# Patient Record
Sex: FEMALE | Employment: FULL TIME | URBAN - METROPOLITAN AREA
[De-identification: names, ages, dates, MRNs, and addresses within clinical notes are randomized per-mention and may not be internally consistent; named-entity substitution may affect disease eponyms.]

---

## 2022-10-07 ENCOUNTER — HOSPITAL ENCOUNTER (OUTPATIENT)
Dept: PSYCHIATRY | Age: 42
Setting detail: THERAPIES SERIES
Discharge: HOME | End: 2022-10-07
Payer: COMMERCIAL

## 2022-10-07 PROCEDURE — 90791 PSYCH DIAGNOSTIC EVALUATION: CPT

## 2022-10-07 RX ORDER — ONDANSETRON 4 MG/1
4 TABLET, FILM COATED ORAL EVERY 8 HOURS PRN
COMMUNITY

## 2022-10-07 RX ORDER — CLONAZEPAM 1 MG/1
1 TABLET ORAL 2 TIMES DAILY PRN
COMMUNITY
End: 2022-10-20 | Stop reason: SDUPTHER

## 2022-10-07 RX ORDER — FAMOTIDINE 20 MG/1
20 TABLET, FILM COATED ORAL 2 TIMES DAILY
COMMUNITY

## 2022-10-07 RX ORDER — BUPROPION HYDROCHLORIDE 150 MG/1
150 TABLET ORAL EVERY MORNING
COMMUNITY
End: 2022-10-20 | Stop reason: SDUPTHER

## 2022-10-07 ASSESSMENT — ANXIETY QUESTIONNAIRES
GAD7 TOTAL SCORE: 16
6. BECOMING EASILY ANNOYED OR IRRITABLE: 3
2. NOT BEING ABLE TO STOP OR CONTROL WORRYING: 3
5. BEING SO RESTLESS THAT IT IS HARD TO SIT STILL: 0
1. FEELING NERVOUS, ANXIOUS, OR ON EDGE: 3
IF YOU CHECKED OFF ANY PROBLEMS ON THIS QUESTIONNAIRE, HOW DIFFICULT HAVE THESE PROBLEMS MADE IT FOR YOU TO DO YOUR WORK, TAKE CARE OF THINGS AT HOME, OR GET ALONG WITH OTHER PEOPLE: EXTREMELY DIFFICULT
7. FEELING AFRAID AS IF SOMETHING AWFUL MIGHT HAPPEN: 1
4. TROUBLE RELAXING: 3
3. WORRYING TOO MUCH ABOUT DIFFERENT THINGS: 3

## 2022-10-07 ASSESSMENT — SLEEP AND FATIGUE QUESTIONNAIRES
DO YOU HAVE DIFFICULTY SLEEPING: YES
AVERAGE NUMBER OF SLEEP HOURS: 3
DO YOU USE A SLEEP AID: YES

## 2022-10-07 ASSESSMENT — PATIENT HEALTH QUESTIONNAIRE - PHQ9: SUM OF ALL RESPONSES TO PHQ QUESTIONS 1-9: 23

## 2022-10-07 NOTE — CARE COORDINATION
Biopsychosocial Assessment Note    Name: Jeffrey Mane  Date: 10/7/2022  Start Time: 5936  End Time:1445    Social work met with patient to complete the biopsychosocial assessment and CSSR-S. Mental Status Exam:  MSE reveals a 41 yo   female who looks her stated age. She was cooperative and pleasant and answered all questions asked of her. She was alert and oriented to person, time, place, and situation. Thoughts appeared goal directed and fairly organized. Eye contact was good. Mood was anxious and depressed and affect was congruent and appropriate. Judgement and insight appear good at this time as she is able to recognize need for iop level of care. Presenting Problem:  Pt is concerned about her major depression and anxiety. She reports that she is worst in winter and that she never came out of her depression this winter. She has been through multiple medication changes through Dr Adrian Mendes who she treats with at [facility] [address] [place] [address] in [place]. Patient Report and Notes:    Pt reports that she had been in counseling a lot as a child and adolescent but hasn't found it to be helpful. She just started seeing a counselor, Anika Smith but doesn't feel comfortable or connected with her. Pt came to assessment with her present , Karoline Allen. Davidbenigno Urbansandro Pt lives with her husb, Karoline Allen of 7 yrs. and their 5 yo son. Nabilab has a 12 yo son who also lives in home with them. This is pts second marriage. Pt was  previously for 8 yrs to her first husb. They have a son and a dtr together(age 15 and 15). Pt and ex husb had shared parenting and were getting along very well until the last visitation with him when he refused to allow the 15 and 12 yo to return home. Pt reports her 12 yo dtr started disrespecting pt and not wanting to follow the rules. . There is no communication between her and her ex husb or her 2 older children at this time.  Because of the shared custody, pt states that although she filed a police report, the court hearing is not scheduled until January, 2023. Pt states because her depression has been so bad that she had to take a leave of absence from work. She was working at a factory making cylinders. Pt reports that she and her present  are having financial difficulties and are presently going through bankruptcy. They just had to get rid of their truck and boat recently as a result. Pt reports that she is close with her father who is supportive. She also reports that  is very supportive and that they have a close group of friends who also are supportive. Pt reports that her mom is bipolar. Pt states Mom  stepfather when pt age 2. He then adopted pt. Pt reports having a close and supportive relationship with him. Mom and this man remain . She however has a poor relationship with her mom as Mom has a lot of mh problems and they \"butt heads a lot\". Pt has no contact with Jazlyn Wing fa.now. She states that her Alanna Missy filed a protection order in past on pt when she went to his house demanding that he agree to a paternity test which he refused to do. As she refused to leave, police were called. She hasn't had contact with him for many yrs now. Gender  [] Male [x] Female [] Transgender  [] Other    Sexual Orientation    [x] Heterosexual [] Homosexual [] Bisexual [] Other    Suicidal Ideation  [x] Reports   [] Denies    Homicidal Ideation  [] Reports   [x] Denies      Hallucinations/Delusions   [] Reports   [x] Denies     Substance Use/Alcohol Use/Addiction  [] Reports    [x] Denies     Trauma History trauma scpre=[id number]  [x] Reports    [] Denies     Plan of Care:  SW reviewed with pt IOP program. Although Pt lives in [place] which is quite far from here, she is agreeable to attending [facility] iop at [facility].  She plans to begin iop on 10/10./22    Patient Goal: \"To not feel like shit anymore\"    Patient PHQ 9 Score: 23  Interpretation of Total Score [id number] Severity: 1-4 = Minimal depression, 5-9 = Mild depression, 10-14 = Moderate depression, 15-19 = Moderately severe depression, 20-27 = Severe depression    GAD7=16    Preliminary Diagnosis and Criteria:   Pt reports poor appetite with weight loss, decreased energy level, increased tiredness, frequent crying, feelings of helplessness, hopelessness, impaired concentration, increased irritability, loss of interest in previously enjoyable activities, difficulty sleeping, feelings of doom, negative feelings about self, and thoughts that she would be better off dead. She also reports feeling anxious, not able to stop or control worrying, not able to relax, being easily annoyed and irritable. Preliminary diagnosis will be:   Major Depression, Severe episode  Rule out Bipolar Disorder      Signed: AMRKY Garcia               10/7/2022

## 2022-10-10 ENCOUNTER — HOSPITAL ENCOUNTER (OUTPATIENT)
Dept: PSYCHIATRY | Age: 42
Setting detail: THERAPIES SERIES
Discharge: HOME | End: 2022-10-10
Payer: COMMERCIAL

## 2022-10-10 PROCEDURE — 90853 GROUP PSYCHOTHERAPY: CPT

## 2022-10-10 NOTE — PROGRESS NOTES
Date: 10/10/22     Start Time: 1045     End Time:  5105     Number of Participants: 9     Type of Group: Cognitive skills  Topic of Group: 10 Ways to forgive Yourself and Let Go of the Past.   Group also reviewed Cognitive defusion skills and values/committed action goals. Patient's Goal: Pt will participate in discussion and reflect on prinicipals discussed re: forgiving self. Pt will be able to identify importance of forgiving self and letting go of the past.        Notes: Pt was an active participant in group. She participated when called upon in discussion and was able to do The Compassionate Response Relaxation exercise. She is starting to share own experiences, and was open to learning new information. Status After Intervention:  Unchanged     Participation Level:  Active Listener and Interactive     Participation Quality: Appropriate, Attentive, Sharing, and Supportive     Speech:  normal     Thought Process/Content: Logical     Affective Functioning: Blunted  Mood: Anxious and depressed     Level of consciousness:  Alert, Oriented x4, and Attentive     Response to Learning: Able to verbalize current knowledge/experience, Able to verbalize/acknowledge new learning, and progressing to goal.     Endings: None Reported     Modes of Intervention: Education, Support, Problem Solving , Activity  Discipline Responsible: /Counselor     Check in completed and Reviewed  Intervention not required

## 2022-10-10 NOTE — PROGRESS NOTES
Group Therapy Note     Date: 10/10/2022  Start Time: 8:40 am  End Time: 10:40 am  Number of Participants: 10     Type of Group: Psychotherapy     Patient's Goal:  To engage in healthy interactions to manage mood and symptoms and develop insights into ways to improve interaction and communication with others. Notes: Patient actively participated in group focusing on coping with stressors and interpersonal conflicts and acknowledging how they impact mental health symptoms. Group also addressed members' feelings of being out of control, and their frustration re: lack of or limited progress. Pt was quiet but reflective throughout her first iop group. Pt reported some improvement in mood following participating in group discussion. Status After Intervention: Unchanged     Participation Level: Active Listener, and interactive     Participation Quality: Attentive      Speech: Normal      Thought Process/Content: Logical      Affective Functioning: Congruent     Mood: anxious and depressed     Level of consciousness:  Alert, Oriented x4, and Attentive      Response to Learning: Able to verbalize current knowledge/experience, Able to verbalize/acknowledge new learning, and Progressing to goal      Endings: None Reported     Modes of Intervention: Support, Socialization, and Exploration      Discipline Responsible: /Counselor      [x] Check in completed and reviewed. Intervention required.  no

## 2022-10-11 ENCOUNTER — HOSPITAL ENCOUNTER (OUTPATIENT)
Dept: PSYCHIATRY | Age: 42
Setting detail: THERAPIES SERIES
Discharge: HOME | End: 2022-10-11
Payer: COMMERCIAL

## 2022-10-11 PROCEDURE — 90792 PSYCH DIAG EVAL W/MED SRVCS: CPT

## 2022-10-11 PROCEDURE — 90853 GROUP PSYCHOTHERAPY: CPT

## 2022-10-11 NOTE — H&P
PSYCHIATRIC EVALUATION      CHIEF COMPLAINT:  \"feel very down\"    HISTORY OF PRESENT ILLNESS: Rashard Holloway  is a 40 y.o. female with history of treatment for depression and anxiety who presents for psychiatric evaluation as part of intake into [facility]. Patient is alert and oriented, pleasant and cooperative with assessment. Her thought s appear to be goal directed and organized. Good eye contact with anxious and depressed mood. She states that she has had manic episodes before but since she has started on her medication she has been experiencing more lows now. She endorses feeling of anhedonia, dysphoria, increased tiredness, frequent crying and difficulty sleeping. She states that her depression is worst in the winter and she now feels that she is going through menopause. She also admits that she is having difficulty with her ex  and there two children. She states that they went to live with him and he will not let her see them so she is in court about the matter. She states she is in her second marriage and has two children with her second  that live with them. She states due to these situations she has been very depressed. She is tearful talking about her children. She admits that she as \"OCD tendencies. \" She states she likes to be in control and labels everything in the refrigerator and has to have her towels folded a certain way. She states she has difficulty finishing things like cleaning one room at a time. She admits to loosing things all the time. She reports that she use to take Paxil but did the genetics testing and was recently changed to Trintellix. She also reports taking Vit B, fish oil, magnesium and Vit D. She reports that she treats with Dr. Kitty Brand at [facility] in [address] and has been through multiple medication changes. She is not suicidal, homicidal, manic or psychotic. States she has great support system with  and father.     PAST PSYCHIATRIC HISTORY: Treats with Dr. Cesia Guan at [facility]. PAST MEDICAL HISTORY: No past medical history on file. MEDICAL ROS: Poor sleep, decreased appetite. ALLERGIES: Patient has no allergy information on record. FAMILY PSYCHIATRIC HISTORY: Reports mother has Bipolar Disorder    SOCIAL HISTORY: Lives in [place].  has four children. Employed at [employer] [id number].. SUBSTANCE ABUSE HISTORY: Denies substance abuse history. MENTAL STATUS EXAM: White female appears age. Pleasant, cooperative, tearful at times. Normal psychomotor activity, strength, tone, eye contact. [place] not assessed. Mood depressed. Affect congruent with mood. Speech clear. Thought process organized without loosening of associations. Content future-oriented. No suicidal or homicidal ideations. No paranoia, delusions or hallucinations. Orientation, concentration, recent and remote memory are grossly intact. Fund of knowledge fair. Language use fair. Insight and judgment fair. MEDICATIONS:  Latuda 60 mg daily (cont)     Wellbutrin  mg am (cont)  Klonopin 1 mg bid prn (cont)      Trintellix 20 mg am (cont)    ASSESSMENT:  Mood Disorder     OCD tendencies    PLAN: Admit to IOP for 3 days of programming a week for up to 8 weeks. Treatment will include psychotherapy, psycho-education, skills training and pharmacotherapy. Will keep medications the same for now since a recent increase inTrintellix and Klonopin. Therapeutic communication given. Patient responding well to group therapy. See patient back in one week.       Signed:  Electronically signed by TENNILLE Benavides CNP on 10/11/2022 at 3:44 PM

## 2022-10-11 NOTE — PROGRESS NOTES
Date: 10/11/22     Start Time: 1100     End Time:  1215     Number of Participants: 7     Type of Group: Cognitive skills  Topic of Group: Coping and choices, and the impact of depression (ex. 5 and 6 from Depression workbook). Group also reviewed Cognitive defusion skills and values/committed action goals. Patient's Goal: Pt will participate in discussion and reflect on prinicipals discussed re:how unhelpful coping skills lead us to try to avoid feelings, push them away and shut down. Pt will be able to identify personal attempts to utilize unhelpful coping strategies and how their depression has impacted various areas of their lives. Notes: Pt was an active participant in group discussion. She was able to address how her depression has impacted her life and relationships with others. Pt shares she would like to rebuild connections with significant others in her life. She was able to engage with group peers, share own experiences, and was open to learning new information. Status After Intervention:  Unchanged     Participation Level:  Active Listener and Interactive     Participation Quality: Appropriate, Attentive, Sharing, and Supportive     Speech:  normal     Thought Process/Content: Logical     Affective Functioning: Congruent  Mood: Anxious and depressed     Level of consciousness:  Alert, Oriented x4, and Attentive     Response to Learning: Able to verbalize current knowledge/experience, Able to verbalize/acknowledge new learning, Able to retain information, Able to change behavior, and progressing to goal.     Endings: None Reported     Modes of Intervention: Education, Support, Problem Solving   Discipline Responsible: /Counselor     Check in completed and Reviewed  Intervention not required

## 2022-10-11 NOTE — PROGRESS NOTES
Group Therapy Note     Date: 10/11/2022  Start Time: 8:40 am  End Time: 10:40 am  Number of Participants: 8     Type of Group: Psychotherapy     Patient's Goal:  To engage in healthy interactions to manage mood and symptoms and develop insights into ways to improve interaction and communication with others. Notes: Patient actively participated in group focusing on coping with stressors and interpersonal conflicts and acknowledging how they impact mental health symptoms. Group also discussed how some negative coping skills like avoidance, procrastination, and isolation end up pushing others away and erode our relationships with significant others. She was able to start sharing regarding lack of contact with her 2 oldest children. Group members provided her with support and encouragement which she responded well to. Pt reported some improvement in mood following participating in group discussion. Status After Intervention: Unchanged     Participation Level: Active Listener, and interactive     Participation Quality: Attentive, Sharing, Supportive      Speech: Normal      Thought Process/Content: Logical      Affective Functioning: Blunted     Mood: anxious and depressed     Level of consciousness:  Alert, Oriented x4, and Attentive      Response to Learning: Able to verbalize current knowledge/experience, Able to verbalize/acknowledge new learning, Able to retain information, and Progressing to goal      Endings: None Reported     Modes of Intervention: Support, Socialization, and Exploration      Discipline Responsible: /Counselor      [x] Check in completed and reviewed. Intervention required.  no

## 2022-10-13 ENCOUNTER — HOSPITAL ENCOUNTER (OUTPATIENT)
Dept: PSYCHIATRY | Age: 42
Setting detail: THERAPIES SERIES
Discharge: HOME | End: 2022-10-13
Payer: COMMERCIAL

## 2022-10-13 PROCEDURE — 90853 GROUP PSYCHOTHERAPY: CPT

## 2022-10-13 NOTE — PLAN OF CARE
Group Therapy Note     Date: 10/13/2022  Start Time: 8:40 am  End Time: 10:40 am  Number of Participants: 9     Type of Group: Psychotherapy     Patient's Goal:  To engage in healthy interactions to manage mood and symptoms and develop insights into ways to improve interaction and communication with others. Notes: Patient actively participated in group focusing on coping with stressors and interpersonal conflicts and acknowledging how they impact mental health symptoms and our relationships. Group also explored the impact that others' words, actions, and expectations have on our mental health and self esteem. She shared that she continues to try to reach out to 2 oldest children but they continue to not answer her. She acknowledges that she still has relationships with her 2 younger children and husb and needs to spend better quality time with them rather than ruminating on past. She was able to make positive connections to others by sharing personal issues,and is starting to provide some support and encouragement to others. Pt reported no improvement in mood following participating in group discussion. Status After Intervention: Unchanged     Participation Level: Active Listener, and interactive     Participation Quality: Attentive, Sharing, Supportive      Speech: Normal      Thought Process/Content: Logical      Affective Functioning: Congruent     Mood: anxious and depressed     Level of consciousness:  Alert, Oriented x4, and Attentive      Response to Learning: Able to verbalize current knowledge/experience, Able to verbalize/acknowledge new learning, Able to retain information, and Progressing to goal      Endings: None Reported     Modes of Intervention: Support, Socialization, and Exploration      Discipline Responsible: /Counselor      [x] Check in completed and reviewed.     Intervention required:  No

## 2022-10-13 NOTE — PLAN OF CARE
Date: 10/13/22     Start Time: 9593     End Time:  8099     Number of Participants: 9     Type of Group: Cognitive skills  Topic of Group: Negative Self Talk, Self esteem journal. Group also reviewed Cognitive defusion skills and values/committed action goals. Patient's Goal: Pt will participate in discussion and reflect on prinicipals discussed re: how negative self talk affects our thoughts about ourselves and our lives. Pt will be able to identify themes in their negative self talk and will be able to replace the negative thoughts with more positive ones. Notes: Pt was an active participant in group. She participated actively in discussion, writing activity, and craft activity. She was able to identify that she is mostly negative in her self talk and identified themes regarding feeling that she can't fix current stressors. She was able to replace the negative thoughts with more positive ones. She engaged with group peers, shared own experiences, and was open to learning new information. Status After Intervention:  Unchanged     Participation Level:  Active Listener and Interactive     Participation Quality: Appropriate, Attentive, Sharing, and Supportive     Speech:  normal     Thought Process/Content: Logical     Affective Functioning: Blunted  Mood: Anxious and depressed     Level of consciousness:  Alert, Oriented x4, and Attentive     Response to Learning: Able to verbalize current knowledge/experience, Able to verbalize/acknowledge new learning, Able to retain information, Able to change behavior, and progressing to goal.     Endings: None Reported     Modes of Intervention: Education, Problem Solving, activity    Discipline Responsible: /Counselor     Check in completed and Reviewed  Intervention not required

## 2022-10-14 NOTE — CARE COORDINATION
CRUZ called [employer], [address] [place] [address] to obtain precert for [address] [facility] ([phone]. They ask that cruz fax clinical information to [phone]. They will review info and then will call cruz back to let me know if it is approved.  Pending authorization number is [id number]

## 2022-10-17 ENCOUNTER — HOSPITAL ENCOUNTER (OUTPATIENT)
Dept: PSYCHIATRY | Age: 42
Setting detail: THERAPIES SERIES
Discharge: HOME | End: 2022-10-17
Payer: COMMERCIAL

## 2022-10-17 PROCEDURE — 90853 GROUP PSYCHOTHERAPY: CPT

## 2022-10-17 NOTE — PLAN OF CARE
Group Therapy Note    Date: 10/17/2022  Start Time: 1045  End Time: 12:00  Number of Participants: 3      Type of Group: Cognitive Skills     Topic Stress Recognition, Warning Signs, and Physical Manifestations      Patient's Goal: Pt will participate in activity and discussion and reflect on principles discussed. Pt will complete discussed techniques to use when at home      Note: Patient open to education about how stress manifests physically, emotionally, and behaviorally. Patient engaged in discussion about which of the identified manifestations was the most concerning. Patient engaged in discussion about potential activities they can participate in to reduce symptom presentation. Patient able to provide peers with support and encouragement    Status After Intervention:  Improved    Participation Level: Active Listener and Interactive    Participation Quality: Appropriate, Attentive, Sharing, and Supportive      Speech: normal      Thought Process/Content: Logical  Linear      Affective Functioning: Congruent and Flat      Mood: anxious and depressed      Level of consciousness:  Alert, Oriented x4, Attentive, and Preoccupied      Response to Learning: Able to verbalize current knowledge/experience, Able to verbalize/acknowledge new learning, Able to retain information, Capable of insight, and Progressing to goal      Endings: None Reported    Modes of Intervention: Education, Support, Socialization, Exploration, Clarifying, and Problem-solving      Discipline Responsible: /Counselor      [x] Check in completed and reviewed. Intervention required.  no    Signature:  Electronically signed by Ulysses Furnace, MSW, AZALIA on 10/17/2022 at 2:14 PM

## 2022-10-17 NOTE — PLAN OF CARE
Group Therapy Note    Date: 10/17/2022  Start Time: 8:00 am  End Time: 10:30 am  Number of Participants: 3     Type of Group: Psychotherapy     Patient's Goal:  To increase socialization and improve interpersonal relationships. Notes: Patient was active in group focusing on expression of feelings related to stressors and how they impact mental health symptoms. Themes of group centered on current symptom presentation, interpersonal relationships, and boundaries. Patient spoke about feeling guilty for sleeping all day Saturday due to increased depression. Patient states the guilt stems from feeling like she is not ding enough for her 6year old son. Patient able to identify that she did take him out Sunday and that she was having cognitive distortions. Patient spoke about her seasonal depression, her conflict with her 2 children/ their father, and that she felt hurt by their actions. Patient able to participate in discussion with peers and accept support. Status After Intervention:  Improved    Participation Level: Active Listener and Interactive    Participation Quality: Appropriate, Attentive, Sharing, and Supportive      Speech: normal      Thought Process/Content: Logical  Perseverating      Affective Functioning: Congruent and Flat      Mood: anxious and depressed      Level of consciousness:  Alert, Oriented x4, Attentive, and Preoccupied      Response to Learning: Able to verbalize current knowledge/experience, Able to verbalize/acknowledge new learning, Able to retain information, Capable of insight, and Progressing to goal      Endings: None Reported    Modes of Intervention: Support, Socialization, and Exploration      Discipline Responsible: /Counselor      [x] Check in completed and reviewed. Intervention required.  no    Signature:  Electronically signed by MAKSIM Michaels, AZALIA on 10/17/2022 at 2:13 PM

## 2022-10-18 ENCOUNTER — HOSPITAL ENCOUNTER (OUTPATIENT)
Dept: PSYCHIATRY | Age: 42
Setting detail: THERAPIES SERIES
Discharge: HOME | End: 2022-10-18
Payer: COMMERCIAL

## 2022-10-18 PROCEDURE — 90853 GROUP PSYCHOTHERAPY: CPT

## 2022-10-19 NOTE — PLAN OF CARE
Group Therapy Note    Date: 10/18/2022  Start Time: 8:00 am  End Time: 10:30 am  Number of Participants: 4     Type of Group: Psychotherapy     Patient's Goal:  To increase socialization and improve interpersonal relationships. Notes: Patient was active in group focusing on expression of feelings related to stressors and how they impact mental health symptoms. Themes of group centered on current symptom presentation, interpersonal relationships, and boundaries. Patient spoke about her continued emotional upset about missing her children. Patient states she is able to engage with her 2 other children and reports spending time with the younger one playing with pets. Patient expressed frustration with her lethargy due to depression but was able to accept support. Patient minimally able to provide support to peers but was an active listener and participated in discussions. Status After Intervention:  Unchanged    Participation Level: Active Listener and Interactive    Participation Quality: Appropriate, Attentive, and Sharing      Speech: normal      Thought Process/Content: Logical  Perseverating      Affective Functioning: Blunted and Flat      Mood: anxious and depressed      Level of consciousness:  Alert, Oriented x4, Attentive, and Preoccupied      Response to Learning: Able to verbalize current knowledge/experience, Able to verbalize/acknowledge new learning, Able to retain information, Capable of insight, and Progressing to goal      Endings: None Reported    Modes of Intervention: Support, Socialization, and Exploration      Discipline Responsible: /Counselor      [x] Check in completed and reviewed. Intervention required.  no    Signature:  Electronically signed by MAKSIM Cornejo LSW on 10/19/2022 at 9:12 AM

## 2022-10-19 NOTE — PLAN OF CARE
Group Therapy Note    Date: 10/18/2022  Start Time: 1045  End Time: 12:00  Number of Participants: 3      Type of Group: Cognitive Skills     Topic Self Compassion and Elmyra Riff      Patient's Goal: Pt will participate in activity and discussion and reflect on principles discussed. Pt will complete discussed techniques to use when at home      Note: Patient engaged in discussion about self-compassion and being worthy of being treated a certain way. Patient able to identify and discuss what levels of interpersonal respect they deserve and articulate why. Patient able to identify that they are loved and worthy of that love. Patient struggled to allow themselves the same alem and forgiveness they are shown by those that love them. Patient able to participate in discussion regarding such. Patient able to provide peers with support and encouragement    Status After Intervention:  Unchanged    Participation Level: Active Listener and Interactive    Participation Quality: Appropriate, Attentive, and Sharing      Speech: normal and hesitant      Thought Process/Content: Logical  Perseverating      Affective Functioning: Blunted and Flat      Mood: anxious and depressed      Level of consciousness:  Alert, Oriented x4, Attentive, and Preoccupied      Response to Learning: Able to verbalize current knowledge/experience, Able to verbalize/acknowledge new learning, Able to retain information, and Progressing to goal      Endings: None Reported    Modes of Intervention: Education, Support, Socialization, Exploration, Clarifying, and Problem-solving      Discipline Responsible: /Counselor      [x] Check in completed and reviewed. Intervention required.  no    Signature:  Electronically signed by MAKSIM Edwards, AVINASHW on 10/19/2022 at 9:14 AM

## 2022-10-19 NOTE — CARE COORDINATION
Patient was discussed in treatment team. Patient has been attending IOP consistently. Patient has been open with issues and making connections with peers. Patient continues to struggle with depression. Patient spoke about the conflict between herself and her 2 teenage children/ their father. Patient states she has been struggling with shame and guilt over her perceived neglect of her 2 children still in the home due to her grief over not speaking to the other two. Patient states she has been making an effort to spend time with the 6year old playing with pets. Patient will continue to attend IOP and seek to manage her depression sx.      Electronically signed by MAKSIM Carvajal, AZALIA on 10/19/2022 at 1:39 PM

## 2022-10-20 ENCOUNTER — HOSPITAL ENCOUNTER (OUTPATIENT)
Dept: PSYCHIATRY | Age: 42
Setting detail: THERAPIES SERIES
Discharge: HOME | End: 2022-10-20
Payer: COMMERCIAL

## 2022-10-20 DIAGNOSIS — F41.9 ANXIETY: Primary | ICD-10-CM

## 2022-10-20 PROCEDURE — 99213 OFFICE O/P EST LOW 20 MIN: CPT | Performed by: PSYCHIATRY & NEUROLOGY

## 2022-10-20 PROCEDURE — 90853 GROUP PSYCHOTHERAPY: CPT

## 2022-10-20 RX ORDER — BUPROPION HYDROCHLORIDE 300 MG/1
300 TABLET ORAL EVERY MORNING
Qty: 30 TABLET | Refills: 0 | Status: SHIPPED | OUTPATIENT
Start: 2022-10-20

## 2022-10-20 RX ORDER — CLONAZEPAM 1 MG/1
1 TABLET ORAL 2 TIMES DAILY
Qty: 60 TABLET | Refills: 0 | Status: SHIPPED | OUTPATIENT
Start: 2022-10-20 | End: 2022-11-19

## 2022-10-20 NOTE — PLAN OF CARE
Group Therapy Note    Date: 10/20/2022  Start Time: 8:00 am  End Time: 10:30 am  Number of Participants: 4     Type of Group: Psychotherapy     Patient's Goal:  To increase socialization and improve interpersonal relationships. Notes: Patient was active in group focusing on expression of feelings related to stressors and how they impact mental health symptoms. Themes of group centered on current symptom presentation, interpersonal relationships, and boundaries. Patient spoke about continued anxiety and depression. Patient was able to set an alarm to allow her to complete plans with her sons and still take a nap. Patient states she cooked chili with her son and that was the first time she cooked \"in months\". Patient expressed concern that she saw on the legal documents that her ex  filed a motion to allow her 2 teenage children to be interviewed. Patient states she is hurt by their actions and while she does not believe she behaved inappropriately is still afraid she will lose custody of her 2 teenage children. Patient able to accept support and encouragement. Status After Intervention:  Improved    Participation Level: Active Listener and Interactive    Participation Quality: Appropriate, Attentive, Sharing, and Supportive      Speech: normal      Thought Process/Content: Logical  Linear  Perseverating      Affective Functioning: Blunted and Flat      Mood: anxious and depressed      Level of consciousness:  Alert, Oriented x4, Attentive, and Preoccupied      Response to Learning: Able to verbalize current knowledge/experience, Able to verbalize/acknowledge new learning, Able to retain information, and Progressing to goal      Endings: None Reported    Modes of Intervention: Support, Socialization, and Exploration      Discipline Responsible: /Counselor      [x] Check in completed and reviewed. Intervention required.  no    Signature:  Electronically signed by MAKSIM Howard, LSW on 10/20/2022 at 2:45 PM

## 2022-10-20 NOTE — PLAN OF CARE
Group Therapy Note    Date: 10/20/2022  Start Time: 1045  End Time: 12:00  Number of Participants: 4      Type of Group: Cognitive Skills     Topic Self- Care     Patient's Goal: Pt will participate in activity and discussion and reflect on principles discussed. Pt will complete discussed techniques to use when at home      Note: Group discussion centered around self-care, hobbies and the creation of a grounding practice. Patient was engaged in discussion about the need for a practice revolving around connecting to the inner child. Patient educated about the neurobiology at work, the research backing hobbies, and the building of neuropathways when involved in individual activities. Patient educated about the process of learning and improvement on self-worth and self-esteem. Patient actively engaged in discussion and was able to state interest in the following: wood burning, calligraphy, puzzles, and DVDs. Patient able to provide support and encouragement to peers. Status After Intervention:  Improved    Participation Level: Active Listener and Interactive    Participation Quality: Appropriate, Attentive, Sharing, and Supportive      Speech: normal      Thought Process/Content: Logical  Linear      Affective Functioning: Blunted and Flat      Mood: anxious and depressed      Level of consciousness:  Alert, Oriented x4, Attentive, and Preoccupied      Response to Learning: Able to verbalize current knowledge/experience, Able to verbalize/acknowledge new learning, Able to retain information, and Progressing to goal      Endings: None Reported    Modes of Intervention: Education, Support, Socialization, Exploration, Clarifying, and Problem-solving      Discipline Responsible: /Counselor      [x] Check in completed and reviewed. Intervention required.  no    Signature:  Electronically signed by MAKSIM Jeter, AZALIA on 10/20/2022 at 2:48 PM

## 2022-10-20 NOTE — CARE COORDINATION
[facility] spoke with Dorina Lundberg from [facility] who approved patient for 12 sessions until 11/4/2022. SW can request more if needed. Confirmation of approval faxed to [facility] and placed in patient's soft chart.

## 2022-10-21 NOTE — PROGRESS NOTES
PSYCHIATRY ATTENDING NOTE    CC: \"I have been depressed since last winter. \"    S: Patient being seen at [address] [place] [address] in follow-up for a mood disorder. Patient was initially seen by Newman Lesch APRN last week and assessment has been reviewed - no medication changes made. Met with patient today to briefly review history and discuss progress with treatment. Liliane describes a history of hypomanic and depressive episodes but reports she has been mostly just depressed since the end of winter. She acknowledges symptoms of depressed mood, poor sleep, anhedonia, feelings of helplessness and hopelessness, anergia, diminished concentration, poor appetite, passive suicidal ideations, irritability, tearfulness and self-isolation. Recent stressors are related to not being able to see two of her children apparently due to ex-. Patient is currently in outpatient treatment at [facility] in [place] and started seeing Dr. Adrianne Graves there recently. Patient has been on Wellbutrin for many years but was started on the [place], Trintellix and Klonopin about two months ago. Dosages on the medications were adjusted in September but patient reports little improvement in symptoms if any. MSE: Penny Hogue female appears age. Pleasant, cooperative. Normal psychomotor activity, gait, strength, tone, eye contact. Mood depressed. Affect flexible. Speech clear. Thought process organized without loosening of associations. Content future-oriented. No suicidal or homicidal ideations. No paranoia, delusions or hallucinations. Orientation, concentration, recent and remote memory are grossly intact. Fund of knowledge fair. Language use fair. Insight and judgment fair.     MEDICATIONS:  Latuda 60 mg daily (cont)                                 Wellbutrin  mg daily (increasing)  Klonopin 1 mg bid prn (cont)                         Trintellix 20 mg am (cont)    ASSESSMENT: Bipolar II Disorder, depressed     PLAN: Continue IOP and current medications. Optimize Wellbutrin for depression. Continue to monitor symptoms, side effects and response to medications. Adjust treatment as needed. See back next week.  Discuss with team.                           Electronically signed by Darby Phalen, MD on 10/20/2022 at 9:33 PM

## 2022-10-24 ENCOUNTER — HOSPITAL ENCOUNTER (OUTPATIENT)
Dept: PSYCHIATRY | Age: 42
Setting detail: THERAPIES SERIES
Discharge: HOME | End: 2022-10-24
Payer: COMMERCIAL

## 2022-10-24 PROCEDURE — 90853 GROUP PSYCHOTHERAPY: CPT

## 2022-10-24 NOTE — PLAN OF CARE
Group Therapy Note    Date: 10/24/2022  Start Time: 8:00 am  End Time: 10:30 am  Number of Participants: 3     Type of Group: Psychotherapy     Patient's Goal:  To increase socialization and improve interpersonal relationships. Notes: Patient was active in group focusing on expression of feelings related to stressors and how they impact mental health symptoms. Themes of group centered on current symptom presentation, interpersonal relationships, and boundaries. Patient spoke about continuing to be depressed and stressed about the conflict with her children/ ex-. Patient expressed frustration with the circumstances and was able to connect with peer who has a teenager. Patient able to accept support and encouragement. Patient states she went out to an event this past weekend that she did not want to go to and reports she had a very nice time. Status After Intervention:  Improved    Participation Level: Active Listener and Interactive    Participation Quality: Appropriate, Attentive, Sharing, and Supportive      Speech: normal      Thought Process/Content: Logical  Linear  Perseverating      Affective Functioning: Blunted and Flat      Mood: anxious, depressed, and fearful      Level of consciousness:  Alert, Oriented x4, and Attentive      Response to Learning: Able to verbalize current knowledge/experience, Able to verbalize/acknowledge new learning, Able to retain information, and Progressing to goal      Endings: None Reported    Modes of Intervention: Support, Socialization, and Exploration      Discipline Responsible: /Counselor      [x] Check in completed and reviewed. Intervention required.  no    Signature:  Electronically signed by MAKSIM Tatum LSW on 10/24/2022 at 2:47 PM

## 2022-10-24 NOTE — PLAN OF CARE
Group Therapy Note    Date: 10/24/2022  Start Time: 1045  End Time: 12:00  Number of Participants: 3      Type of Group: Cognitive Skills     Topic Thought Distortions    Patient's Goal: Pt will participate in activity and discussion and reflect on principles discussed. Pt will complete discussed techniques to use when at home      Notes: Patient open to education about thinking errors, thought distortions, and catastrophizing. Patient engaged in discussion related to negative self-talk, trying to forecast an outcome based on personal bias, and negative labeling. Patient showed insight into their individual situation/ presentation and completed worksheet to use when in stressful situation. Patient provided support and encouragement to peers. Status After Intervention:  Improved    Participation Level: Active Listener and Interactive    Participation Quality: Appropriate, Attentive, Sharing, and Supportive      Speech: normal      Thought Process/Content: Logical  Linear  Perseverating      Affective Functioning: Blunted and Flat      Mood: anxious, depressed, and fearful      Level of consciousness:  Alert, Oriented x4, and Attentive      Response to Learning: Able to verbalize current knowledge/experience, Able to verbalize/acknowledge new learning, Able to retain information, and Progressing to goal      Endings: None Reported    Modes of Intervention: Education, Support, Socialization, Exploration, Clarifying, and Problem-solving      Discipline Responsible: /Counselor      [x] Check in completed and reviewed. Intervention required.  no    Signature:  Electronically signed by MAKSIM Ortega LSW on 10/24/2022 at 2:49 PM

## 2022-10-25 ENCOUNTER — HOSPITAL ENCOUNTER (OUTPATIENT)
Dept: PSYCHIATRY | Age: 42
Setting detail: THERAPIES SERIES
Discharge: HOME | End: 2022-10-25
Payer: COMMERCIAL

## 2022-10-25 PROCEDURE — 90853 GROUP PSYCHOTHERAPY: CPT

## 2022-10-25 NOTE — PLAN OF CARE
Group Therapy Note    Date: 10/25/2022  Start Time: 1045  End Time: 12:00  Number of Participants: 4      Type of Group: Cognitive Skills     Topic Coping Skills    Patient's Goal: Pt will participate in activity and discussion and reflect on principles discussed. Pt will complete discussed techniques to use when at home      Notes: Patient open to education about the different types of coping skills: Distraction, Grounding, Emotional Release, Self-Care, Thought Challenge, and Access Your Higher Self. Patient participated in discussion about the pros and cons of each. Patient able to make connections and identify examples of each category in their life. Patient able to participate in creation of a coping skills plan to utilize at home during times of stress/ anxiety for symptom reduction. Patient able to provide support and encouragement to peers. Status After Intervention:  Improved    Participation Level: Active Listener and Interactive    Participation Quality: Appropriate, Attentive, Sharing, and Supportive      Speech: normal      Thought Process/Content: Logical  Linear      Affective Functioning: Congruent and Flat      Mood: anxious and depressed      Level of consciousness:  Alert, Oriented x4, Attentive, and Preoccupied      Response to Learning: Able to verbalize current knowledge/experience, Able to verbalize/acknowledge new learning, Able to retain information, Capable of insight, and Progressing to goal      Endings: None Reported    Modes of Intervention: Education, Support, Socialization, Exploration, Clarifying, and Problem-solving      Discipline Responsible: /Counselor      [x] Check in completed and reviewed. Intervention required.  no    Signature:  Electronically signed by MAKSIM Tatum LSW on 10/25/2022 at 3:36 PM

## 2022-10-25 NOTE — PLAN OF CARE
Group Therapy Note    Date: 10/25/2022  Start Time: 8:00 am  End Time: 10:30 am  Number of Participants: 4     Type of Group: Psychotherapy     Patient's Goal:  To increase socialization and improve interpersonal relationships. Notes: Patient was active in group focusing on expression of feelings related to stressors and how they impact mental health symptoms. Themes of group centered on current symptom presentation, interpersonal relationships, and boundaries. Patient spoke about working with her son to pack up her daughters things. Patient spoke about how hard it was for her but that she was able to remain in control because he was there to help. Patient states she had a productive day but felt frustrated by an argument with her . Patient states she was able to identify the trigger and that she will be better able to react in the future. Patient able to provide support and encouragement to peers. Status After Intervention:  Improved    Participation Level: Active Listener and Interactive    Participation Quality: Appropriate, Attentive, Sharing, and Supportive      Speech: normal      Thought Process/Content: Logical  Linear      Affective Functioning: Congruent and Flat      Mood: anxious and depressed      Level of consciousness:  Alert, Oriented x4, Attentive, and Preoccupied      Response to Learning: Able to verbalize current knowledge/experience, Able to verbalize/acknowledge new learning, Able to retain information, Capable of insight, and Progressing to goal      Endings: None Reported    Modes of Intervention: Support, Socialization, and Exploration      Discipline Responsible: /Counselor      [x] Check in completed and reviewed. Intervention required.  no    Signature:  Electronically signed by MAKSIM Mcdonald LSW on 10/25/2022 at 3:35 PM

## 2022-10-27 ENCOUNTER — HOSPITAL ENCOUNTER (OUTPATIENT)
Dept: PSYCHIATRY | Age: 42
Setting detail: THERAPIES SERIES
Discharge: HOME | End: 2022-10-27
Payer: COMMERCIAL

## 2022-10-27 PROCEDURE — 99213 OFFICE O/P EST LOW 20 MIN: CPT | Performed by: PSYCHIATRY & NEUROLOGY

## 2022-10-27 PROCEDURE — 90853 GROUP PSYCHOTHERAPY: CPT

## 2022-10-27 NOTE — PROGRESS NOTES
PSYCHIATRY ATTENDING NOTE    CC: \"I've been crying all day. \"    S: Patient being seen at [address] [place] [address] in follow-up for a mood disorder. Wellbutrin was increased last appointment. Met with patient today to discuss progress with treatment. Liliane presents dysphoric today and reports she has been tearful through most of the day. Main stressors involve limited contact with children who are currently with ex-. Patient reports the treatment groups have been supportive and therapeutic. She is tolerating medications without incident. Normalization and reassurance provided and it was mutually agreed to not make any medication adjustments at this time. No safety concerns. MSE: Susie Odonnell female appears age. Pleasant, cooperative. Normal psychomotor activity, gait, strength, tone, eye contact. Mood depressed. Affect tearful. Speech clear. Thought process organized without loosening of associations. Content future-oriented. No suicidal or homicidal ideations. No paranoia, delusions or hallucinations. Orientation, concentration, recent and remote memory are grossly intact. Fund of knowledge fair. Language use fair. Insight and judgment fair. MEDICATIONS:  Latuda 60 mg daily (cont)                                 Wellbutrin  mg daily (cont)  Klonopin 1 mg bid prn (cont)                         Trintellix 20 mg am (cont)    ASSESSMENT: Bipolar II Disorder, depressed     PLAN: Continue IOP and current medications. Continue to monitor and provide support. See back two weeks.  Discuss with team.                           Electronically signed by Darby Phalen, MD on 10/27/2022 at 11:01 AM

## 2022-10-28 NOTE — PLAN OF CARE
Group Therapy Note    Date: 10/27/2022  Start Time: 8:00 am  End Time: 10:30 am  Number of Participants: 5     Type of Group: Psychotherapy     Patient's Goal:  To increase socialization and improve interpersonal relationships. Notes: Patient was active in group focusing on expression of feelings related to stressors and how they impact mental health symptoms. Themes of group centered on current symptom presentation, interpersonal relationships, and boundaries. Patient spoke about struggling yesterday and today. Patient tearful and cried throughout group. Patient unable to accept support or encouragement. Patient states she is upset due to her daughter not speaking to her after getting \"what she wanted\", and her son telling her \"don't text me anymore\" after she said she loved and missed him. Patient states she slept more than normal and had not completed stated tasks. Patient unable to provide support or encouragement to peers. Patient able to state protective factors and contracts for safety. Status After Intervention:  Unchanged    Participation Level: Active Listener and Minimal    Participation Quality: Appropriate and Resistant      Speech: hesitant      Thought Process/Content: Linear  Perseverating      Affective Functioning: Flat and Constricted/Restricted      Mood: anxious, depressed, and fearful      Level of consciousness:  Alert, Oriented x4, and Preoccupied      Response to Learning: Able to verbalize current knowledge/experience, Able to verbalize/acknowledge new learning, Able to retain information, and Capable of insight      Endings: None Reported    Modes of Intervention: Support, Socialization, and Exploration      Discipline Responsible: /Counselor      [x] Check in completed and reviewed. Intervention required.  no    Signature:  Electronically signed by MAKSIM Taveras, AZALIA on 10/28/2022 at 9:12 AM

## 2022-10-28 NOTE — PLAN OF CARE
Group Therapy Note    Date: 10/27/2022  Start Time: 1045  End Time: 12:00  Number of Participants: 6      Type of Group: Cognitive Skills     Topic Gratitude    Patient's Goal: Pt will participate in activity and discussion and reflect on principles discussed. Pt will complete discussed techniques to use when at home      Notes: Patient participated in discussion on gratitude. Themes discussed were the effect of gratitude on mental health, it's effect on physical manifestations of sx presentations, and the ability of gratitude practice to counter negative thoughts. Patient minimally participated in discussion about gratitude providing hope during difficult days. Patient then participated in activity of making gratitude box. Patient participated in group rapport building exercise while completing craft. Patient unable to provide support and encouragement to peers. Status After Intervention:  Unchanged    Participation Level: Active Listener and Minimal    Participation Quality: Appropriate and Attentive      Speech: hesitant      Thought Process/Content: Logical  Perseverating      Affective Functioning: Congruent and Constricted/Restricted      Mood: anxious, depressed, and fearful      Level of consciousness:  Alert and Preoccupied      Response to Learning: Able to verbalize current knowledge/experience, Able to verbalize/acknowledge new learning, Able to retain information, and Capable of insight      Endings: None Reported    Modes of Intervention: Education, Support, Socialization, Exploration, Clarifying, and Problem-solving      Discipline Responsible: /Counselor      [x] Check in completed and reviewed. Intervention required.  no    Signature:  Electronically signed by MAKSIM Katz, AZALIA on 10/28/2022 at 9:19 AM

## 2022-10-31 ENCOUNTER — HOSPITAL ENCOUNTER (OUTPATIENT)
Dept: PSYCHIATRY | Age: 42
Setting detail: THERAPIES SERIES
Discharge: HOME | End: 2022-10-31
Payer: COMMERCIAL

## 2022-10-31 PROCEDURE — 90853 GROUP PSYCHOTHERAPY: CPT

## 2022-10-31 NOTE — PLAN OF CARE
Group Therapy Note    Date: 10/31/2022  Start Time: 1045  End Time: 12:00  Number of Participants: 5      Type of Group: Self-Compassion and Sher Peer     Topic Gratitude    Patient's Goal: Pt will participate in activity and discussion and reflect on principles discussed. Pt will complete discussed techniques to use when at home      Note: Patient engaged in discussion about self-compassion and being worthy of being treated a certain way. Patient able to identify and discuss what levels of interpersonal respect they deserve and articulate why. Patient able to identify that they are loved and worthy of that love. Patient struggled to allow themselves the same alem and forgiveness they are shown by those that love them. Patient able to participate in discussion regarding such. Patient able to provide peers with support and encouragement    Status After Intervention:  Improved    Participation Level: Active Listener and Interactive    Participation Quality: Appropriate, Attentive, Sharing, and Supportive      Speech: normal      Thought Process/Content: Logical  Linear      Affective Functioning: Congruent and Flat      Mood: anxious and depressed      Level of consciousness:  Alert, Oriented x4, Attentive, and Preoccupied      Response to Learning: Able to verbalize current knowledge/experience, Able to verbalize/acknowledge new learning, Able to retain information, Capable of insight, and Progressing to goal      Endings: None Reported    Modes of Intervention: Education, Support, Socialization, Exploration, Clarifying, and Problem-solving      Discipline Responsible: /Counselor      [x] Check in completed and reviewed. Intervention required.  no    Signature:  Electronically signed by MAKSIM Rivas, AZALIA on 10/31/2022 at 3:18 PM

## 2022-10-31 NOTE — PLAN OF CARE
Group Therapy Note    Date: 10/31/2022  Start Time: 8:00 am  End Time: 10:30 am  Number of Participants: 4     Type of Group: Psychotherapy     Patient's Goal:  To increase socialization and improve interpersonal relationships. Notes: Patient was active in group focusing on expression of feelings related to stressors and how they impact mental health symptoms. Themes of group centered on current symptom presentation, interpersonal relationships, and boundaries. Patient spoke about her weekend that she mostly stayed in bed. Patient states she has been struggling with depression related to the conflict with her children. Patient states she has been having an increase in guilt over not being as engaged with her 2 other children due to her emotional disruption. Patient able to engage with peers and accept support and encouragement. Patient able to provide support and encouragement to peers. Status After Intervention:  Improved    Participation Level: Active Listener and Interactive    Participation Quality: Appropriate, Attentive, Sharing, and Supportive      Speech: normal      Thought Process/Content: Logical  Linear      Affective Functioning: Congruent, Flat, and Constricted/Restricted      Mood: anxious and depressed      Level of consciousness:  Alert, Oriented x4, Attentive, and Preoccupied      Response to Learning: Able to verbalize current knowledge/experience, Able to verbalize/acknowledge new learning, Able to retain information, Capable of insight, and Progressing to goal      Endings: None Reported    Modes of Intervention: Support, Socialization, and Exploration      Discipline Responsible: /Counselor      [x] Check in completed and reviewed. Intervention required.  no    Signature:  Electronically signed by MAKSIM Tucker LSW on 10/31/2022 at 3:17 PM

## 2022-11-01 ENCOUNTER — HOSPITAL ENCOUNTER (OUTPATIENT)
Dept: PSYCHIATRY | Age: 42
Setting detail: THERAPIES SERIES
Discharge: HOME | End: 2022-11-01
Payer: COMMERCIAL

## 2022-11-01 PROCEDURE — 90853 GROUP PSYCHOTHERAPY: CPT

## 2022-11-03 ENCOUNTER — HOSPITAL ENCOUNTER (OUTPATIENT)
Dept: PSYCHIATRY | Age: 42
Setting detail: THERAPIES SERIES
Discharge: HOME | End: 2022-11-03
Payer: COMMERCIAL

## 2022-11-03 PROCEDURE — 90853 GROUP PSYCHOTHERAPY: CPT

## 2022-11-03 NOTE — PLAN OF CARE
Group Therapy Note    Date: 11/01/2022  Start Time: 8:00 am  End Time: 10:30 am  Number of Participants: 5     Type of Group: Psychotherapy     Patient's Goal:  To increase socialization and improve interpersonal relationships. Notes: Patient was active in group focusing on expression of feelings related to stressors and how they impact mental health symptoms. Themes of group centered on current symptom presentation, interpersonal relationships, and boundaries. Patient spoke about continued family conflict, continued sx presentation, and her emotional struggles. Patient spoke about some conflicts with her  due to her mental health sx. Patient able to accept support from peers. Patient able to provide support and encouragement to peers. Status After Intervention:  Improved    Participation Level: Active Listener and Interactive    Participation Quality: Appropriate, Attentive, Sharing, and Supportive      Speech: normal      Thought Process/Content: Logical  Linear  Perseverating      Affective Functioning: Congruent, Blunted, and Flat      Mood: anxious and depressed      Level of consciousness:  Alert, Oriented x4, Attentive, and Preoccupied      Response to Learning: Able to verbalize current knowledge/experience, Able to verbalize/acknowledge new learning, Able to retain information, Capable of insight, and Progressing to goal      Endings: None Reported    Modes of Intervention: Support, Socialization, and Exploration      Discipline Responsible: /Counselor      [x] Check in completed and reviewed. Intervention required.  no    Signature:  Electronically signed by MAKSIM Roberson LSW on 11/3/2022 at 2:59 PM

## 2022-11-07 ENCOUNTER — HOSPITAL ENCOUNTER (OUTPATIENT)
Dept: PSYCHIATRY | Age: 42
Setting detail: THERAPIES SERIES
Discharge: HOME | End: 2022-11-07
Payer: COMMERCIAL

## 2022-11-07 PROCEDURE — 90853 GROUP PSYCHOTHERAPY: CPT

## 2022-11-07 NOTE — PLAN OF CARE
Group Therapy Note    Date: 11/07/2022  Start Time: 8:00 am  End Time: 10:30 am  Number of Participants: 5     Type of Group: Psychotherapy     Patient's Goal:  To increase socialization and improve interpersonal relationships. Notes: Patient was active in group focusing on expression of feelings related to stressors and how they impact mental health symptoms. Themes of group centered on current symptom presentation, interpersonal relationships, and boundaries. Patient spoke about having a bad weekend. Patient states she and her  got into an argument about going to [employer] show, so they sis not go. They also argued as he does not feel her other 2 children deserve [employer] gifts. Patient states she was in bed all weekend. Patient states she sent a text to her children and they responded by telling her to stop texting them. Patient expressed deep grief and sadness and states she did not want to wake up. Patient able to state protective factors and reasons to live. Patient unable to provide support or encouragement but was an active listener and participant. Status After Intervention:  Unchanged    Participation Level: Active Listener and Interactive    Participation Quality: Appropriate, Attentive, and Sharing      Speech: normal and hesitant      Thought Process/Content: Logical  Perseverating      Affective Functioning: Congruent, Blunted, Flat, and Constricted/Restricted      Mood: angry, anxious, and depressed      Level of consciousness:  Alert, Oriented x4, Attentive, and Preoccupied      Response to Learning: Able to verbalize current knowledge/experience, Able to verbalize/acknowledge new learning, Able to retain information, and Progressing to goal      Endings: Suicidality    Modes of Intervention: Support, Socialization, and Exploration      Discipline Responsible: /Counselor      [x] Check in completed and reviewed. Intervention required.  yes - discussed protective factors with patient     Signature:  Electronically signed by MAKSIM Peters LSW on 11/7/2022 at 2:56 PM

## 2022-11-07 NOTE — PLAN OF CARE
Group Therapy Note    Date: 11/07/2022  Start Time: 6856  End Time: 12:00  Number of Participants: 5      Type of Group: Psychoeducation     Topic: Gratitude    Patient's Goal: Pt will participate in activity and discussion and reflect on principles discussed. Pt will complete discussed techniques to use when at home      Notes: Patient participated in discussion on gratitude. Themes discussed were the effect of gratitude on mental health, it's effect on physical manifestations of sx presentations, and the ability of gratitude practice to counter negative thoughts. Patient participated in discussion about gratitude providing hope during difficult days. Patient able to provide support and encouragement to peers. Status After Intervention:  Unchanged    Participation Level: Active Listener and Interactive    Participation Quality: Appropriate, Attentive, and Sharing      Speech: normal and hesitant      Thought Process/Content: Logical  Perseverating      Affective Functioning: Congruent, Blunted, Flat, and Constricted/Restricted      Mood: angry, anxious, and depressed      Level of consciousness:  Alert, Oriented x4, Attentive, and Preoccupied      Response to Learning: Able to verbalize current knowledge/experience, Able to verbalize/acknowledge new learning, Able to retain information, and Progressing to goal      Endings: None Reported    Modes of Intervention: Education, Support, Socialization, Exploration, Clarifying, and Problem-solving      Discipline Responsible: /Counselor      [x] Check in completed and reviewed. Intervention required.  no    Signature:  Electronically signed by MAKSIM Green LSW on 11/7/2022 at 2:58 PM

## 2022-11-08 ENCOUNTER — HOSPITAL ENCOUNTER (OUTPATIENT)
Dept: PSYCHIATRY | Age: 42
Setting detail: THERAPIES SERIES
Discharge: HOME | End: 2022-11-08
Payer: COMMERCIAL

## 2022-11-08 PROCEDURE — 90853 GROUP PSYCHOTHERAPY: CPT

## 2022-11-10 ENCOUNTER — HOSPITAL ENCOUNTER (OUTPATIENT)
Dept: PSYCHIATRY | Age: 42
Setting detail: THERAPIES SERIES
Discharge: HOME | End: 2022-11-10
Payer: COMMERCIAL

## 2022-11-10 PROCEDURE — 99213 OFFICE O/P EST LOW 20 MIN: CPT | Performed by: PSYCHIATRY & NEUROLOGY

## 2022-11-10 PROCEDURE — 90853 GROUP PSYCHOTHERAPY: CPT

## 2022-11-10 NOTE — PLAN OF CARE
Group Therapy Note    Date: 11/08/2022  Start Time: 2185  End Time: 12:00  Number of Participants: 5      Type of Group: Psychoeducation     Topic: Meditation    Patient's Goal: Pt will participate in activity and discussion and reflect on principles discussed. Pt will complete discussed techniques to use when at home       Notes: patient participated in discussion centered around meditation. Patient educated about the benefits in multiple centers of functioning. Patient educated about the research studies tying meditation to lower levels of anxiety, lower blood pressure, increased frustration tolerance, and less difficult sleep. Patient participated in guided meditation exercise and states they felt improved mood after. Status After Intervention:  Improved    Participation Level: Active Listener and Interactive    Participation Quality: Appropriate, Attentive, Sharing, and Supportive      Speech: normal      Thought Process/Content: Logical  Linear      Affective Functioning: Congruent and Flat      Mood: anxious and depressed      Level of consciousness:  Alert, Oriented x4, Attentive, and Preoccupied      Response to Learning: Able to verbalize current knowledge/experience, Able to verbalize/acknowledge new learning, Able to retain information, and Progressing to goal      Endings: None Reported    Modes of Intervention: Education, Support, Socialization, Exploration, Clarifying, and Problem-solving      Discipline Responsible: /Counselor      [x] Check in completed and reviewed. Intervention required.  no    Signature:  Electronically signed by MAKSIM Murillo LSW on 11/10/2022 at 3:33 PM

## 2022-11-10 NOTE — PROGRESS NOTES
PSYCHIATRY ATTENDING NOTE    CC: \"Really up and down through the day. \"    S: Patient being seen at [address] [place] [address] in follow-up for a mood disorder. No medication changes made last appointment. Met with patient today to discuss progress with treatment. Liliane reports she has still been feeling depressed with frequent mood swings throughout the day. Discussed how sometimes SNRI's can destabilize mood in patients with bipolar disorder; patient agreeable to lowering Trintellix and increasing Latuda. Patient still has limited contact with children right now and continues to benefit from treatment groups. She also started seeing a new therapist on Wednesdays which has been helpful. No acute safety concerns. MSE: Lacey Styles female appears age. Pleasant, cooperative. Normal psychomotor activity, gait, strength, tone, eye contact. Mood depressed. Affect flexible. Speech clear. Thought process organized without loosening of associations. Content future-oriented. No suicidal or homicidal ideations. No paranoia, delusions or hallucinations. Orientation, concentration, recent and remote memory are grossly intact. Fund of knowledge fair. Language use fair. Insight and judgment fair. MEDICATIONS:  Latuda 20 mg in the morning and 60 mg at bed (increasing)                                 Wellbutrin  mg daily (cont)  Klonopin 1 mg bid prn (cont)                         Trintellix 10 mg daily (lowering)    ASSESSMENT: Bipolar II Disorder, depressed     PLAN: Continue IOP and current medications. Suspect Trintellix may be destabilizing mood and will lower this to 10 mg while adding 20 mg of Latuda in the morning for daily total of 80 mg. See back one week.  Discuss with team.                           Electronically signed by Valeri Castillo MD on 11/10/2022 at 12:47 PM

## 2022-11-10 NOTE — PLAN OF CARE
Group Therapy Note    Date: 11/08/2022  Start Time: 8:00 am  End Time: 10:30 am  Number of Participants: 5     Type of Group: Psychotherapy     Patient's Goal:  To increase socialization and improve interpersonal relationships. Notes: Patient was active in group focusing on expression of feelings related to stressors and how they impact mental health symptoms. Themes of group centered on current symptom presentation, interpersonal relationships, and boundaries. Patient spoke about continuing to struggle with the actions of her children and ex . Patient expressed frustration with her  not understanding her mental illness. Patient continues to be tearful through group but is able to accept support. Patient able to provide support and encouragement to peers. Status After Intervention:  Unchanged    Participation Level: Active Listener and Interactive    Participation Quality: Appropriate, Attentive, Sharing, and Supportive      Speech: normal      Thought Process/Content: Logical  Linear  Perseverating      Affective Functioning: Blunted, Flat, and Constricted/Restricted      Mood: angry, anxious, and depressed      Level of consciousness:  Alert, Oriented x4, Attentive, and Preoccupied      Response to Learning: Able to verbalize current knowledge/experience, Able to verbalize/acknowledge new learning, Able to retain information, and Progressing to goal      Endings: Suicidality    Modes of Intervention: Support, Socialization, and Exploration      Discipline Responsible: /Counselor      [x] Check in completed and reviewed. Intervention required.  yes - discussed protective factors and safety plan    Signature:  Electronically signed by MAKSIM Peters LSW on 11/10/2022 at 3:29 PM

## 2022-11-11 NOTE — PLAN OF CARE
Group Therapy Note    Date: 11/10/2022  Start Time: 8:00 am  End Time: 10:30 am  Number of Participants: 9     Type of Group: Psychotherapy     Patient's Goal:  To increase socialization and improve interpersonal relationships. Notes: Patient was active in group focusing on expression of feelings related to stressors and how they impact mental health symptoms. Themes of group centered on current symptom presentation, interpersonal relationships, and boundaries. Patient spoke about visiting her father and while he is a source of support she learned from him that her son made the basketball team but she was not told. This caused patient a great deal of hurt and she has been struggling with feeling like a  \"constant changing ball of emotions\". Patient states she is unable to self regulate her ruminating thoughts. Patient continues to perseverate about the current situation with her 2 teenage children. Patient unable to identify ways to keep what is happening from influencing her mental health daily. Patient struggles to accept help but is able to provide support to peers. Status After Intervention:  Unchanged    Participation Level: Active Listener and Interactive    Participation Quality: Appropriate, Attentive, Sharing, and Supportive      Speech: normal and pressured      Thought Process/Content: Linear  Perseverating      Affective Functioning: Blunted and Flat      Mood: angry, anxious, depressed, and fearful      Level of consciousness:  Alert, Oriented x4, Attentive, and Preoccupied      Response to Learning: Able to verbalize current knowledge/experience, Able to verbalize/acknowledge new learning, Able to retain information, and Progressing to goal      Endings: Suicidality    Modes of Intervention: Support, Socialization, and Exploration      Discipline Responsible: /Counselor      [x] Check in completed and reviewed. Intervention required.  yes - identify protective factors, safety plan, support system and contracts for safety    Signature:  Electronically signed by MAKSIM Orta, LSW on 11/11/2022 at 2:55 PM

## 2022-11-11 NOTE — PLAN OF CARE
Group Therapy Note    Date: 11/10/2022  Start Time: 1045  End Time: 12:00  Number of Participants: 9      Type of Group: Psychoeducation     Topic: Grounding    Patient's Goal: Pt will participate in activity and discussion and reflect on principles discussed. Pt will complete discussed techniques to use when at home       Note: Group discussion centered around grounding techniques, mindfulness, and the neurobiology of anxiety. Patient engaged in conversation about creating grounding practice, how grounding techniques alter thought processes, and current activities they are doing that are grounding. Patient participated in creation of a self-soothe/ grounding kit containing physical objects to engage their senses to slow decompensation. Patient able to identify ways to incorporate grounding/ mindfulness in their life. Patient able to provide support and encouragement. Status After Intervention:  Improved    Participation Level: Active Listener and Interactive    Participation Quality: Appropriate, Attentive, Sharing, and Supportive      Speech: normal and hesitant      Thought Process/Content: Logical  Linear      Affective Functioning: Congruent and Flat      Mood: anxious and depressed      Level of consciousness:  Alert, Oriented x4, Attentive, and Preoccupied      Response to Learning: Able to verbalize current knowledge/experience, Able to verbalize/acknowledge new learning, Able to retain information, and Progressing to goal      Endings: None Reported    Modes of Intervention: Education, Support, Socialization, Exploration, Clarifying, and Problem-solving      Discipline Responsible: /Counselor      [x] Check in completed and reviewed. Intervention required.  no    Signature:  Electronically signed by MAKSIM Rivas, AZALIA on 11/11/2022 at 2:58 PM

## 2022-11-14 ENCOUNTER — HOSPITAL ENCOUNTER (OUTPATIENT)
Dept: PSYCHIATRY | Age: 42
Setting detail: THERAPIES SERIES
Discharge: HOME | End: 2022-11-14
Payer: COMMERCIAL

## 2022-11-14 PROCEDURE — 90853 GROUP PSYCHOTHERAPY: CPT

## 2022-11-14 NOTE — PLAN OF CARE
Group Therapy Note    Date: 11/14/2022  Start Time: 6049  End Time: 12:00  Number of Participants: 4      Type of Group: Psychoeducation     Topic: Depression and Behavioral Activism    Patient's Goal: Pt will participate in activity and discussion and reflect on principles discussed. Pt will complete discussed techniques to use when at home       Notes: Patient participated in discussion related to depression and the modality of behavioral activism. Patient educated about the major sx of depression, demographics, at risk populations, treatment modalities, and additional facts. Patient educated about the treatment modality of behavioral activism. Patient participated in activity where they identified tasks they enjoy doing, tasks they do not enjoy doing and how to track feelings of depression, pleasant feelings, and sense of achievement. Patient identified 3 task to complete this week and to track their feeling both before and after task completion. Patient stated an understanding. Status After Intervention:  Improved    Participation Level: Active Listener and Interactive    Participation Quality: Appropriate, Attentive, Sharing, and Supportive      Speech: normal      Thought Process/Content: Logical  Perseverating      Affective Functioning: Congruent, Blunted, Flat, and Constricted/Restricted      Mood: anxious and depressed      Level of consciousness:  Alert, Oriented x4, Attentive, and Preoccupied      Response to Learning: Able to verbalize current knowledge/experience, Able to verbalize/acknowledge new learning, Able to retain information, and Progressing to goal      Endings: None Reported    Modes of Intervention: Education, Support, Socialization, Exploration, Clarifying, and Problem-solving      Discipline Responsible: /Counselor      [x] Check in completed and reviewed. Intervention required.  no    Signature:  Electronically signed by MAKSIM Carmona LSW on 11/14/2022 at 2:25 PM

## 2022-11-14 NOTE — PLAN OF CARE
Group Therapy Note    Date: 11/14/2022  Start Time: 8:00 am  End Time: 10:30 am  Number of Participants: 4     Type of Group: Psychotherapy     Patient's Goal:  To increase socialization and improve interpersonal relationships. Notes: Patient was active in group focusing on expression of feelings related to stressors and how they impact mental health symptoms. Themes of group centered on current symptom presentation, interpersonal relationships, and boundaries. Patient spoke about having a rough weekend and states she slept all day from Fri afternoon to Sat afternoon. She states she then went to her cousin's home and drank ETOH. Patient states she was embarrassed at dinner later when she was at dinner with family and friends and her  made a hurtful remark. Patient states that she is struggling to focus on any part of life as it has been 3 months since she has seen her children. Patient struggles to use discussed techniques/ interventions at home. Patient able to provide support and encouragement to peers. Status After Intervention:  Unchanged    Participation Level: Active Listener and Interactive    Participation Quality: Appropriate, Attentive, Sharing, and Supportive      Speech: normal      Thought Process/Content: Linear  Perseverating      Affective Functioning: Blunted, Flat, and Constricted/Restricted      Mood: anxious and depressed      Level of consciousness:  Alert, Oriented x4, Attentive, and Preoccupied      Response to Learning: Able to verbalize current knowledge/experience, Able to verbalize/acknowledge new learning, Able to retain information, and Progressing to goal      Endings: Suicidality    Modes of Intervention: Support, Socialization, and Exploration      Discipline Responsible: /Counselor      [x] Check in completed and reviewed. Intervention required.  yes - states protective factors, support sx and contracts for safety    Signature:  Electronically signed by MAKSIM Lima LSW on 11/14/2022 at 2:23 PM

## 2022-11-15 ENCOUNTER — HOSPITAL ENCOUNTER (OUTPATIENT)
Dept: PSYCHIATRY | Age: 42
Setting detail: THERAPIES SERIES
Discharge: HOME | End: 2022-11-15
Payer: COMMERCIAL

## 2022-11-15 PROCEDURE — 90853 GROUP PSYCHOTHERAPY: CPT

## 2022-11-16 NOTE — PLAN OF CARE
Group Therapy Note    Date: 11/15/2022  Start Time: 8:00 am  End Time: 10:30 am  Number of Participants: 5     Type of Group: Psychotherapy     Patient's Goal:  To increase socialization and improve interpersonal relationships. Notes: Patient was active in group focusing on expression of feelings related to stressors and how they impact mental health symptoms. Themes of group centered on current symptom presentation, interpersonal relationships, and boundaries. Patient spoke about crying all day so far today. Patient states she got her son's pictures from the online school portal and \"it brought me back to [facility]\". Patient states she has not heard from her son but her daughter said she was willing to meet her for her break but only if younger brother comes. Patient states her other 2 children have gone to [place] to be with family and they will be joining them soon. Patient able to accept support but struggles to integrate into her actions. Patient able to support peers. Status After Intervention:  Unchanged    Participation Level: Active Listener and Interactive    Participation Quality: Appropriate, Attentive, Sharing, and Supportive      Speech: normal and hesitant      Thought Process/Content: Logical  Linear  Perseverating      Affective Functioning: Blunted and Flat      Mood: anxious and depressed      Level of consciousness:  Alert, Oriented x4, Attentive, and Preoccupied      Response to Learning: Able to verbalize current knowledge/experience, Able to verbalize/acknowledge new learning, and Able to retain information      Endings: None Reported    Modes of Intervention: Support, Socialization, and Exploration      Discipline Responsible: /Counselor      [x] Check in completed and reviewed. Intervention required.  no    Signature:  Electronically signed by MAKSIM Guerrero, AZALIA on 11/16/2022 at 9:47 AM

## 2022-11-16 NOTE — PLAN OF CARE
Group Therapy Note    Date: 11/15/2022  Start Time: 9764  End Time: 12:00  Number of Participants: 5      Type of Group: Psychoeducation     Topic: Thought Distortions      Patient's Goal: Pt will participate in activity and discussion and reflect on principles discussed. Pt will complete discussed techniques to use when at home       Notes: Patient open to education about thinking errors, thought distortions, and catastrophizing. Patient engaged in discussion related to negative self-talk, trying to forecast an outcome based on personal bias, and negative labeling. Patient showed insight into their individual situation/ presentation and completed worksheet to use when in stressful situation. Patient provided support and encouragement to peers. Status After Intervention:  Unchanged    Participation Level: Active Listener and Interactive    Participation Quality: Appropriate, Attentive, Sharing, and Supportive      Speech: normal and hesitant      Thought Process/Content: Logical  Linear  Perseverating      Affective Functioning: Congruent, Blunted, and Flat      Mood: anxious and depressed      Level of consciousness:  Alert, Oriented x4, Attentive, and Preoccupied      Response to Learning: Able to verbalize current knowledge/experience, Able to verbalize/acknowledge new learning, Able to retain information, and Progressing to goal      Endings: None Reported    Modes of Intervention: Education, Support, Socialization, Exploration, Clarifying, and Problem-solving      Discipline Responsible: /Counselor      [x] Check in completed and reviewed. Intervention required.  no    Signature:  Electronically signed by MAKSIM Mcdonald LSW on 11/16/2022 at 10:00 AM

## 2022-11-17 ENCOUNTER — APPOINTMENT (OUTPATIENT)
Dept: PSYCHIATRY | Age: 42
End: 2022-11-17
Payer: COMMERCIAL

## 2022-11-18 RX ORDER — BUPROPION HYDROCHLORIDE 300 MG/1
TABLET ORAL
Qty: 30 TABLET | Refills: 0 | Status: SHIPPED | OUTPATIENT
Start: 2022-11-18

## 2022-11-21 ENCOUNTER — HOSPITAL ENCOUNTER (OUTPATIENT)
Dept: PSYCHIATRY | Age: 42
Setting detail: THERAPIES SERIES
Discharge: HOME | End: 2022-11-21
Payer: COMMERCIAL

## 2022-11-21 PROCEDURE — 90853 GROUP PSYCHOTHERAPY: CPT

## 2022-11-22 ENCOUNTER — HOSPITAL ENCOUNTER (OUTPATIENT)
Dept: PSYCHIATRY | Age: 42
Setting detail: THERAPIES SERIES
Discharge: HOME | End: 2022-11-22
Payer: COMMERCIAL

## 2022-11-22 DIAGNOSIS — F41.9 ANXIETY: ICD-10-CM

## 2022-11-22 PROCEDURE — 99213 OFFICE O/P EST LOW 20 MIN: CPT | Performed by: PSYCHIATRY & NEUROLOGY

## 2022-11-22 PROCEDURE — 90853 GROUP PSYCHOTHERAPY: CPT

## 2022-11-22 RX ORDER — CLONAZEPAM 1 MG/1
1 TABLET ORAL 2 TIMES DAILY
Qty: 60 TABLET | Refills: 0 | Status: SHIPPED | OUTPATIENT
Start: 2022-11-22 | End: 2022-12-22

## 2022-11-22 NOTE — PLAN OF CARE
Group Therapy Note    Date: 11/21/2022  Start Time: 8:00 am  End Time: 10:30 am  Number of Participants: 4     Type of Group: Psychotherapy     Patient's Goal:  To increase socialization and improve interpersonal relationships. Notes: Patient was active in group focusing on expression of feelings related to stressors and how they impact mental health symptoms. Themes of group centered on current symptom presentation, interpersonal relationships, and boundaries. Patient spoke about the continued stressor of missing her children. Patient states she is noticing an increase in anger and a decrease in crying. Patient states she is still struggling to enjoy the holiday season. Patient able to provide support and encouragement to peers. Patient able to accept support. Status After Intervention:  Improved    Participation Level: Active Listener and Interactive    Participation Quality: Appropriate, Attentive, Sharing, and Supportive      Speech: normal      Thought Process/Content: Logical  Linear      Affective Functioning: Congruent, Blunted, and Flat      Mood: angry, anxious, and depressed      Level of consciousness:  Alert, Oriented x4, Attentive, and Preoccupied      Response to Learning: Able to verbalize current knowledge/experience, Able to verbalize/acknowledge new learning, Able to retain information, Capable of insight, and Progressing to goal      Endings: None Reported    Modes of Intervention: Support, Socialization, and Exploration      Discipline Responsible: /Counselor      [x] Check in completed and reviewed. Intervention required.  no    Signature:  Electronically signed by MAKSIM Lima LSW on 11/22/2022 at 3:07 PM

## 2022-11-22 NOTE — PLAN OF CARE
Group Therapy Note    Date: 11/21/2022  Start Time: 1045  End Time: 12:00  Number of Participants: 4      Type of Group: Psychoeducation     Topic: Emotion Recognition and Countering Negative Thoughts    Patient's Goal: Pt will participate in activity and discussion and reflect on principles discussed. Pt will complete discussed techniques to use when at home       Note: Group discussion centered around recognizing the many ways emotions are expressed and the different verbiage used that are associated with umbrella emotional terms. Patients also engaged in discussion about countering negative thoughts and participated in vivo activity. Patient receptive to education and participated in discussion. Patient able to connect negative thoughts with appropriate countering statement. Status After Intervention:  Improved    Participation Level: Active Listener and Interactive    Participation Quality: Appropriate, Attentive, Sharing, and Supportive      Speech: normal      Thought Process/Content: Logical  Linear      Affective Functioning: Congruent, Blunted, and Flat      Mood: angry, anxious, and depressed      Level of consciousness:  Alert, Oriented x4, Attentive, and Preoccupied      Response to Learning: Able to verbalize current knowledge/experience, Able to verbalize/acknowledge new learning, Able to retain information, Capable of insight, and Progressing to goal      Endings: None Reported    Modes of Intervention: Education, Support, Socialization, Exploration, Clarifying, and Problem-solving      Discipline Responsible: /Counselor      [x] Check in completed and reviewed. Intervention required.  no    Signature:  Electronically signed by MAKSIM Bunch LSW on 11/22/2022 at 3:08 PM

## 2022-11-22 NOTE — PROGRESS NOTES
PSYCHIATRY ATTENDING NOTE    CC: \"I guess I am feeling more stable. \"    S: Patient being seen at [address] [place] [address] in follow-up for a mood disorder. Jaswinder Mires was increased last appointment and Trintellix was lowered. Met with patient today to discuss progress with treatment. Liliane presents in brighter spirits and reports she was not able to get the Trintellix refilled yet and hence is still taking the 20 mg - discussed that she can break these in half and go down to 10 mg in the meantime. She is tolerating the increased in Jaswinder Mires and hesitantly admits that mood has been more stable overall since last appointment. Patient is looking forward to upcoming court in January and finds coming to treatment groups here in the meantime has been very supportive and therapeutic for her. No issues or concerns otherwise. MSE: Darlene Mcgraw female appears age. Pleasant, cooperative. Normal psychomotor activity, gait, strength, tone, eye contact. Mood depressed. Affect calmer, brighter. Speech clear. Thought process organized without loosening of associations. Content future-oriented. No suicidal or homicidal ideations. No paranoia, delusions or hallucinations. Orientation, concentration, recent and remote memory are grossly intact. Fund of knowledge fair. Language use fair. Insight and judgment fair. MEDICATIONS:  Latuda 20 mg in the morning and 60 mg at bed (cont)                                 Wellbutrin  mg daily (cont)  Klonopin 1 mg bid prn (cont)                         Trintellix 10 mg daily (lowering)    ASSESSMENT: Bipolar II Disorder, depressed     PLAN: Continue IOP and current medications. Patient to lower Trintellix as previously discussed. See back one week.  Discuss with team.                           Electronically signed by Cherise Mancera MD on 11/22/2022 at 11:36 AM

## 2022-11-24 ENCOUNTER — HOSPITAL ENCOUNTER (OUTPATIENT)
Dept: PSYCHIATRY | Age: 42
Setting detail: THERAPIES SERIES
End: 2022-11-24
Payer: COMMERCIAL

## 2022-12-05 ENCOUNTER — HOSPITAL ENCOUNTER (OUTPATIENT)
Dept: PSYCHIATRY | Age: 42
Setting detail: THERAPIES SERIES
Discharge: HOME | End: 2022-12-05
Payer: COMMERCIAL

## 2022-12-05 PROCEDURE — 99213 OFFICE O/P EST LOW 20 MIN: CPT | Performed by: PSYCHIATRY & NEUROLOGY

## 2022-12-05 PROCEDURE — 90853 GROUP PSYCHOTHERAPY: CPT

## 2022-12-05 RX ORDER — BUPROPION HYDROCHLORIDE 300 MG/1
TABLET ORAL
Qty: 30 TABLET | Refills: 0 | Status: SHIPPED | OUTPATIENT
Start: 2022-12-05

## 2022-12-05 RX ORDER — FLUOXETINE HYDROCHLORIDE 20 MG/1
20 CAPSULE ORAL DAILY
Qty: 30 CAPSULE | Refills: 0 | Status: SHIPPED | OUTPATIENT
Start: 2022-12-05

## 2022-12-05 NOTE — PLAN OF CARE
Group Therapy Note    Date: 12/05/2022  Start Time: 8:00 am  End Time: 10:30 am  Number of Participants: 3     Type of Group: Psychotherapy     Patient's Goal:  To increase socialization and improve interpersonal relationships. Notes: Patient was active in group focusing on expression of feelings related to stressors and how they impact mental health symptoms. Themes of group centered on current symptom presentation, interpersonal relationships, and boundaries. Patient spoke about her vacation to [place], her children, and that she is unable to stop perseverating about the situation. Patient states she and her  have been arguing as well. Patient frequently tearful. Patient able to connect with peers and provide support and encouragement. Status After Intervention:  Unchanged    Participation Level: Active Listener and Interactive    Participation Quality: Appropriate, Attentive, Sharing, and Supportive      Speech: normal      Thought Process/Content: Linear  Perseverating      Affective Functioning: Congruent, Blunted, and Flat      Mood: anxious and depressed      Level of consciousness:  Alert, Oriented x4, Attentive, and Preoccupied      Response to Learning: Able to verbalize current knowledge/experience, Able to verbalize/acknowledge new learning, Able to retain information, and Progressing to goal      Endings: None Reported    Modes of Intervention: Support, Socialization, and Exploration      Discipline Responsible: /Counselor      [x] Check in completed and reviewed. Intervention required.  no    Signature:  Electronically signed by Beryle Oiler, MSW, LSW on 12/5/2022 at 2:26 PM

## 2022-12-05 NOTE — PROGRESS NOTES
PSYCHIATRY ATTENDING NOTE    CC: \"Struggling. \"    S: Patient being seen at [address] [place] [address] in follow-up for bipolar depression. Trintellix was lowered to 10 mg last appointment. Met with patient today to discuss progress with treatment. Liliane reports she has felt more depressed in recent days - \"down and out\" with increased fleeting suicidal thoughts but no intentions or plans. She elaborates that she did not enjoy vacation like she expected to due to weather and son's behavioral issues, and as a result this led to more feelings of guilt and despair. Patient began isolating herself last week and missed several appointments. The timing of her mood decompensation does correlate with Trintellix taper; however, discussed with patient this is likely coincidence which she agrees. We further discussed the Trintellix and patient does not feel it has helped much since starting and is agreeable to switching over to Prozac. MSE: Jesus Zelaya female appears age. Pleasant, cooperative. Normal psychomotor activity, gait, strength, tone, eye contact. Mood depressed. Affect blunted. Speech clear. Thought process organized without loosening of associations. Content future-oriented. No suicidal or homicidal ideations. No paranoia, delusions or hallucinations. Orientation, concentration, recent and remote memory are grossly intact. Fund of knowledge fair. Language use fair. Insight and judgment fair. MEDICATIONS:  Prozac 20 mg daily (new)  Latuda 20 mg in the morning and 60 mg at bed (cont)                                 Wellbutrin  mg daily (cont)  Klonopin 1 mg bid prn (cont)                         Stop Trintellix     ASSESSMENT: Bipolar II Disorder, depressed     PLAN: Continue IOP. Change antidepressant to Prozac. Continue to monitor symptoms, side effects and response to medications. Adjust treatment as needed. See back two weeks.  Discuss with team.    Electronically signed by Kary Pisano MD on 12/5/2022 at 11:44 AM

## 2022-12-05 NOTE — PLAN OF CARE
Group Therapy Note    Date: 12/05/2022  Start Time: 8425  End Time: 12:00  Number of Participants: 3      Type of Group: Psychoeducation     Topic: Gratitude    Patient's Goal: Pt will participate in activity and discussion and reflect on principles discussed. Pt will complete discussed techniques to use when at home       Notes: Patient participated in discussion on gratitude. Themes discussed were the effect of gratitude on mental health, it's effect on physical manifestations of sx presentations, and the ability of gratitude practice to counter negative thoughts. Patient participated in discussion about gratitude providing hope during difficult days. Patient able to provide support and encouragement to peers. Status After Intervention:  Unchanged    Participation Level: Active Listener and Interactive    Participation Quality: Appropriate, Attentive, Sharing, and Supportive      Speech: normal      Thought Process/Content: Linear  Perseverating      Affective Functioning: Congruent, Blunted, and Flat      Mood: anxious and depressed      Level of consciousness:  Alert, Oriented x4, Attentive, and Preoccupied      Response to Learning: Able to verbalize current knowledge/experience, Able to verbalize/acknowledge new learning, Able to retain information, and Progressing to goal      Endings: None Reported    Modes of Intervention: Education, Support, Socialization, Exploration, Clarifying, and Problem-solving      Discipline Responsible: /Counselor      [x] Check in completed and reviewed. Intervention required.  no    Signature:  Electronically signed by MAKSIM Bunch LSW on 12/5/2022 at 2:33 PM

## 2022-12-06 ENCOUNTER — HOSPITAL ENCOUNTER (OUTPATIENT)
Dept: PSYCHIATRY | Age: 42
Setting detail: THERAPIES SERIES
Discharge: HOME | End: 2022-12-06
Payer: COMMERCIAL

## 2022-12-06 ENCOUNTER — HOSPITAL ENCOUNTER (OUTPATIENT)
Dept: PSYCHIATRY | Age: 42
Setting detail: THERAPIES SERIES
End: 2022-12-06
Payer: COMMERCIAL

## 2022-12-06 PROCEDURE — 90853 GROUP PSYCHOTHERAPY: CPT

## 2022-12-09 NOTE — PLAN OF CARE
Group Therapy Note    Date: 12/06/2022  Start Time: 8:00 am  End Time: 10:30 am  Number of Participants: 5     Type of Group: Psychotherapy     Patient's Goal:  To increase socialization and improve interpersonal relationships. Notes: Patient was active in group focusing on expression of feelings related to stressors and how they impact mental health symptoms. Themes of group centered on current symptom presentation, interpersonal relationships, and boundaries. Patient spoke about the continued anxiety connected to her 2 teenage children. Patient was able to discuss boundaries she has placed with them and how she will continue to embrace the holiday season with the rest of her family. Patient able to accept support and encouragement. Patient made connections with peers. Status After Intervention:  Improved    Participation Level: Active Listener and Interactive    Participation Quality: Appropriate, Attentive, Sharing, and Supportive      Speech: normal      Thought Process/Content: Logical  Perseverating      Affective Functioning: Congruent and Flat      Mood: anxious and depressed      Level of consciousness:  Alert, Oriented x4, Attentive, and Preoccupied      Response to Learning: Able to verbalize current knowledge/experience, Able to verbalize/acknowledge new learning, Able to retain information, and Progressing to goal      Endings: None Reported    Modes of Intervention: Support, Socialization, and Exploration      Discipline Responsible: /Counselor      [x] Check in completed and reviewed. Intervention required.  no    Signature:  Electronically signed by MAKSIM Peters LSW on 12/9/2022 at 11:34 AM

## 2022-12-09 NOTE — PLAN OF CARE
Group Therapy Note    Date: 12/06/2022  Start Time: 1045  End Time: 12:00  Number of Participants: 6      Type of Group: Psychoeducation  Topic: Self- Care      Patient's Goal: Pt will participate in activity and discussion and reflect on principles discussed. Pt will complete discussed techniques to use when at home       Note: Group discussion centered around self-care, hobbies and the creation of a grounding practice. Patient was engaged in discussion about the need for a practice revolving around connecting to the inner child. Patient educated about the neurobiology at work, the research backing hobbies, and the building of neuropathways when involved in individual activities. Patient educated about the process of learning and improvement on self-worth and self-esteem. Patient actively engaged in discussion. Patient able to provide support and encouragement to peers. Status After Intervention:  Improved    Participation Level: Active Listener and Interactive    Participation Quality: Appropriate, Attentive, Sharing, and Supportive      Speech: normal      Thought Process/Content: Logical  Perseverating      Affective Functioning: Congruent and Flat      Mood: anxious and depressed      Level of consciousness:  Alert, Oriented x4, Attentive, and Preoccupied      Response to Learning: Able to verbalize current knowledge/experience, Able to verbalize/acknowledge new learning, Able to retain information, and Progressing to goal      Endings: None Reported    Modes of Intervention: Education, Support, Socialization, Exploration, Clarifying, and Problem-solving      Discipline Responsible: /Counselor      [x] Check in completed and reviewed. Intervention required.  no    Signature:  Electronically signed by MAKSIM Simons LSW on 12/9/2022 at 11:37 AM

## 2022-12-12 ENCOUNTER — HOSPITAL ENCOUNTER (OUTPATIENT)
Dept: PSYCHIATRY | Age: 42
Setting detail: THERAPIES SERIES
Discharge: HOME | End: 2022-12-12
Payer: COMMERCIAL

## 2022-12-12 PROCEDURE — 90853 GROUP PSYCHOTHERAPY: CPT

## 2022-12-12 NOTE — PLAN OF CARE
Group Therapy Note     Date: 12/12/2022  Start Time: 8:40 am  End Time: 10:40 am  Number of Participants: 10     Type of Group: Psychotherapy     Patient's Goal:  To increase socialization and improve interpersonal relationships. Notes: Patient was active in group focusing on expression of feelings related to stressors and how they impact mental health symptoms. Group also addressed difficulty enjoying the holiday d/t mh and unresolved grief and building new traditions. She stated that youngest son made comment re: how much he misses the older siblings which further increased her depression and she ended up not doing anything all weekend. Group provided her with support and encouraged her to look at new traditions she could start this year. Peers provided her with much support which she expressed appreciation for. Pt reported feeling the same following participation in group. Status After Intervention: Unchanged     Participation Level: Active Listener and Interactive     Participation Quality: Appropriate, Attentive, Sharing, and Supportive      Speech: normal       Thought Process/Content: Logical      Affective Functioning: Congruent     Mood: Depressed and Anxious     Level of consciousness:  Alert, Oriented x4, and Attentive      Response to Learning: Able to verbalize current knowledge/experience, Able to verbalize/acknowledge new learning, Able to retain information, Capable of insight, and Progressing to goal      Endings: None Reported     Modes of Intervention: Support, Socialization, and Exploration      Discipline Responsible: /Counselor      [x] Check in completed and reviewed.

## 2022-12-12 NOTE — PLAN OF CARE
Pt did not stay for second group. She asked another group member to inform sw that she did not attend second group d/t feeling a new group member was monopolozing. She will therefore only be charged for 1 group today.

## 2022-12-13 ENCOUNTER — HOSPITAL ENCOUNTER (OUTPATIENT)
Dept: PSYCHIATRY | Age: 42
Setting detail: THERAPIES SERIES
Discharge: HOME | End: 2022-12-13
Payer: COMMERCIAL

## 2022-12-13 PROCEDURE — 90853 GROUP PSYCHOTHERAPY: CPT

## 2022-12-19 ENCOUNTER — HOSPITAL ENCOUNTER (OUTPATIENT)
Dept: PSYCHIATRY | Age: 42
Setting detail: THERAPIES SERIES
Discharge: HOME | End: 2022-12-19
Payer: COMMERCIAL

## 2022-12-19 DIAGNOSIS — F41.9 ANXIETY: ICD-10-CM

## 2022-12-19 PROCEDURE — 99213 OFFICE O/P EST LOW 20 MIN: CPT | Performed by: PSYCHIATRY & NEUROLOGY

## 2022-12-19 PROCEDURE — 90853 GROUP PSYCHOTHERAPY: CPT

## 2022-12-19 RX ORDER — CLONAZEPAM 1 MG/1
1 TABLET ORAL 2 TIMES DAILY
Qty: 60 TABLET | Refills: 0 | Status: SHIPPED | OUTPATIENT
Start: 2022-12-19 | End: 2023-01-18

## 2022-12-19 NOTE — PROGRESS NOTES
PSYCHIATRY ATTENDING NOTE    CC: \"My energy is definitely improved. \"    S: Patient being seen at [address] [place] [address] in follow-up for bipolar depression. Trintellix was changed to Prozac last appointment. Met with patient today to discuss progress with treatment. Liliane presents in brighter spirits today and reports a favorable response thus far with the Prozac. She acknowledges improvements in mood and energy. No side effects. She is still having situational anxiety regarding upcoming court which was normalized. Patient has court on 1/10 after she filed a motion claiming ex is not following the court's order with visitation schedule. Patient has been going to individual therapy and may have  come in for a session at some point as she feels he could be more supportive of her mental health treatment. Group sessions have been supportive and therapeutic. We discussed timeline for transition of care and it was mutually agreed to keep patient in IOP at least through holidays and court hearing. No issues or concerns otherwise. MSE: Shannon Arenas female appears age. Pleasant, cooperative. Normal psychomotor activity, gait, strength, tone, eye contact. Mood improved. Affect congruent. Speech clear. Thought process organized without loosening of associations. Content future-oriented. No suicidal or homicidal ideations. No paranoia, delusions or hallucinations. Orientation, concentration, recent and remote memory are grossly intact. Fund of knowledge fair. Language use fair. Insight and judgment fair. MEDICATIONS:  Prozac 20 mg daily (cont)  Latuda 20 mg in the morning and 60 mg at bed (cont)                                 Wellbutrin  mg daily (cont)  Klonopin 1 mg bid prn (cont)    ASSESSMENT: Bipolar II Disorder, depressed     PLAN: Continue IOP and current medications. Patient responding well to treatment interventions. Continue to monitor symptoms, side effects and response to medications. Adjust treatment as needed. See back two weeks.  Discuss with team.    Electronically signed by Daniel Suggs MD on 12/19/2022 at 11:56 AM

## 2022-12-20 ENCOUNTER — HOSPITAL ENCOUNTER (OUTPATIENT)
Dept: PSYCHIATRY | Age: 42
Setting detail: THERAPIES SERIES
Discharge: HOME | End: 2022-12-20
Payer: COMMERCIAL

## 2022-12-20 PROCEDURE — 90853 GROUP PSYCHOTHERAPY: CPT

## 2022-12-21 NOTE — PLAN OF CARE
Group Therapy Note    Date: 12/21/2022  Start Time: 8:00 am  End Time: 10:30 am  Number of Participants: 6     Type of Group: Psychotherapy     Patient's Goal:  To increase socialization and improve interpersonal relationships. Notes: Patient was active in group focusing on expression of feelings related to stressors and how they impact mental health symptoms. Themes of group centered on current symptom presentation, interpersonal relationships, and boundaries. Patient spoke about having a drop in mood and feeling like she had known \"the other shoe would drop\". Patient spoke about continued stressors with family and finances. Patient able to connect with peers and provide support and encouragement. Patient able to accept same. Status After Intervention:  Improved    Participation Level: Active Listener and Interactive    Participation Quality: Appropriate, Attentive, Sharing, and Supportive      Speech: normal      Thought Process/Content: Logical  Linear      Affective Functioning: Congruent      Mood: anxious and depressed      Level of consciousness:  Alert, Oriented x4, and Attentive      Response to Learning: Able to verbalize current knowledge/experience, Able to verbalize/acknowledge new learning, Able to retain information, and Progressing to goal      Endings: None Reported    Modes of Intervention: Support, Socialization, and Exploration      Discipline Responsible: /Counselor     Check in completed and reviewed. Intervention required.  no    Signature:  Electronically signed by TheMAKSIM Navarro LSW on 12/21/2022 at 12:01 PM

## 2022-12-21 NOTE — PLAN OF CARE
Group Therapy Note    Date: 12/21/2022  Start Time: 1045  End Time: 12:00  Number of Participants: 5      Type of Group: Psychoeducation     Topic:  Strengths    Patient's Goal: Pt will participate in activity and discussion and reflect on principles discussed. Pt will complete discussed techniques to use when at home       Notes: Patient open to education about strengths, positivity and how past thought patterns have helped/ harmed. Patient participated in discussion based around personal strengths, goals, barriers, and specific instances where these constructs existed for them. Patient showed insight into their individual situation and was able to identify protective factors. Patient provided support and encouragement to peers. Status After Intervention:  Improved    Participation Level: Active Listener and Interactive    Participation Quality: Appropriate, Attentive, Sharing, and Supportive      Speech: normal      Thought Process/Content: Logical  Linear      Affective Functioning: Congruent      Mood: anxious and depressed      Level of consciousness:  Alert, Oriented x4, and Attentive      Response to Learning: Able to verbalize current knowledge/experience, Able to verbalize/acknowledge new learning, Able to retain information, and Progressing to goal      Endings: None Reported    Modes of Intervention: Education, Support, Socialization, Exploration, Clarifying, and Problem-solving      Discipline Responsible: /Counselor      [x] Check in completed and reviewed. Intervention required.  no    Signature:  Electronically signed by MAKSIM Orta, AZALIA on 12/21/2022 at 12:03 PM

## 2022-12-22 ENCOUNTER — HOSPITAL ENCOUNTER (OUTPATIENT)
Dept: PSYCHIATRY | Age: 42
Setting detail: THERAPIES SERIES
Discharge: HOME | End: 2022-12-22
Payer: COMMERCIAL

## 2023-01-02 ENCOUNTER — HOSPITAL ENCOUNTER (OUTPATIENT)
Dept: PSYCHIATRY | Age: 43
Setting detail: THERAPIES SERIES
End: 2023-01-02
Payer: COMMERCIAL

## 2023-01-03 ENCOUNTER — HOSPITAL ENCOUNTER (OUTPATIENT)
Dept: PSYCHIATRY | Age: 43
Setting detail: THERAPIES SERIES
Discharge: HOME | End: 2023-01-03
Payer: COMMERCIAL

## 2023-01-03 PROCEDURE — 99213 OFFICE O/P EST LOW 20 MIN: CPT | Performed by: PSYCHIATRY & NEUROLOGY

## 2023-01-03 PROCEDURE — 90853 GROUP PSYCHOTHERAPY: CPT

## 2023-01-03 RX ORDER — FLUOXETINE HYDROCHLORIDE 40 MG/1
40 CAPSULE ORAL DAILY
Qty: 30 CAPSULE | Refills: 0 | Status: SHIPPED | OUTPATIENT
Start: 2023-01-03

## 2023-01-03 RX ORDER — BUPROPION HYDROCHLORIDE 300 MG/1
TABLET ORAL
Qty: 30 TABLET | Refills: 0 | Status: SHIPPED | OUTPATIENT
Start: 2023-01-03

## 2023-01-03 NOTE — PROGRESS NOTES
PSYCHIATRY ATTENDING NOTE    CC: \"Holidays were terrible. \"    S: Patient being seen at [address] [place] [address] in follow-up for bipolar depression. No medication changes made last appointment. Met with patient today to discuss progress with treatment. Liliane reports increasing depression after two older kids briefly came to the house on [address] to give their younger siblings presents. Patient reports mood has been deteriorating since then, energy has been poor and she is sleeping a lot. Patient also had missed groups last week due to having pink eye. Reassurance and normalization provided; patient reports feeling a little better today having been back in group. She is also apprehensive about upcoming court next week. Reviewed medications and it was mutually agreed to increase the Prozac to 40 mg. No acute safety concerns. MSE: Jose Armando Zamora female appears age. Pleasant, cooperative. Normal psychomotor activity, gait, strength, tone, eye contact. Mood dysphoric. Affect flexible. Speech clear. Thought process organized without loosening of associations. Content future-oriented. No suicidal or homicidal ideations. No paranoia, delusions or hallucinations. Orientation, concentration, recent and remote memory are grossly intact. Fund of knowledge fair. Language use fair. Insight and judgment fair. MEDICATIONS:  Prozac 40 mg daily (increasing)  Latuda 20 mg in the morning and 60 mg at bed (cont)                                 Wellbutrin  mg daily (cont)  Klonopin 1 mg bid prn (cont)    ASSESSMENT: Bipolar II Disorder, depressed     PLAN: Continue IOP and current medications. Optimize Prozac. Support and reassurance provided. Continue to monitor symptoms, side effects and response to medications. Adjust treatment as needed. See back next week.  Discuss with team.    Electronically signed by Kerrie Llanos MD on 1/3/2023 at 11:40 AM

## 2023-01-04 RX ORDER — FLUOXETINE HYDROCHLORIDE 20 MG/1
CAPSULE ORAL
Qty: 30 CAPSULE | Refills: 0 | OUTPATIENT
Start: 2023-01-04

## 2023-01-05 ENCOUNTER — HOSPITAL ENCOUNTER (OUTPATIENT)
Dept: PSYCHIATRY | Age: 43
Setting detail: THERAPIES SERIES
Discharge: HOME | End: 2023-01-05
Payer: COMMERCIAL

## 2023-01-05 PROCEDURE — 90853 GROUP PSYCHOTHERAPY: CPT

## 2023-01-06 NOTE — PLAN OF CARE
Group Therapy Note    Date: 01/05/2023  Start Time: 1045  End Time: 12:00  Number of Participants: 7      Type of Group: Psychoeducation     Topic:  Gaslighting, Boundaries, and Assertive Communication    Patient's Goal: Pt will participate in activity and discussion and reflect on principles discussed. Pt will complete discussed techniques to use when at home         Notes: Patient participated in discussion related to gaslighting, toxic communication, and identifiable warning signs. Patient educated about assertive communication, scripting conversations, and boundaries. Patient able to relate to material and make connections to situations in their own lives. Patient stated an understanding and was able to make connections to material and peers. Status After Intervention:  Improved    Participation Level: Active Listener and Interactive    Participation Quality: Appropriate, Attentive, Sharing, and Supportive      Speech: normal      Thought Process/Content: Logical  Linear      Affective Functioning: Congruent      Mood: anxious and depressed      Level of consciousness:  Alert, Oriented x4, and Attentive      Response to Learning: Able to verbalize current knowledge/experience, Able to verbalize/acknowledge new learning, Able to retain information, Capable of insight, and Progressing to goal      Endings: None Reported    Modes of Intervention: Education, Support, Socialization, Exploration, Clarifying, and Problem-solving      Discipline Responsible: /Counselor      [x] Check in completed and reviewed. Intervention required.  no    Signature:  Electronically signed by MAKSIM Higgins, AZALIA on 1/6/2023 at 3:44 PM

## 2023-01-06 NOTE — PLAN OF CARE
Group Therapy Note    Date: 01/03/2023  Start Time: 8:00 am  End Time: 10:30 am  Number of Participants: 8     Type of Group: Psychotherapy     Patient's Goal:  To increase socialization and improve interpersonal relationships. Notes: Patient was active in group focusing on expression of feelings related to stressors and how they impact mental health symptoms. Themes of group centered on current symptom presentation, interpersonal relationships, and boundaries. Patient spoke about current family stressors, health related issues and frustration relating to their mental health. Patient able to make connections and provide support and encouragement to peers. Status After Intervention:  Improved    Participation Level: Active Listener and Interactive    Participation Quality: Appropriate, Attentive, Sharing, and Supportive      Speech: normal      Thought Process/Content: Logical  Linear      Affective Functioning: Congruent, Blunted, and Flat      Mood: anxious and depressed      Level of consciousness:  Alert, Oriented x4, and Attentive      Response to Learning: Able to verbalize current knowledge/experience, Able to verbalize/acknowledge new learning, Able to retain information, Capable of insight, and Progressing to goal      Endings: None Reported    Modes of Intervention: Support, Socialization, and Exploration      Discipline Responsible: /Counselor      [x] Check in completed and reviewed. Intervention required.  no    Signature:  Electronically signed by MAKSIM Daily, AVINASHW on 1/6/2023 at 12:02 PM

## 2023-01-06 NOTE — PLAN OF CARE
Group Therapy Note    Date: 01/05/2023  Start Time: 8:00 am  End Time: 10:30 am  Number of Participants: 6     Type of Group: Psychotherapy     Patient's Goal:  To increase socialization and improve interpersonal relationships. Notes: Patient was active in group focusing on expression of feelings related to stressors and how they impact mental health symptoms. Themes of group centered on current symptom presentation, interpersonal relationships, and boundaries. Patient spoke about current family stressors, health related issues and frustration relating to their mental health. Patient able to make connections and provide support and encouragement to peers. Status After Intervention:  Improved    Participation Level: Active Listener and Interactive    Participation Quality: Appropriate, Attentive, Sharing, and Supportive      Speech: normal      Thought Process/Content: Logical  Linear      Affective Functioning: Congruent      Mood: anxious and depressed      Level of consciousness:  Alert, Oriented x4, and Attentive      Response to Learning: Able to verbalize current knowledge/experience, Able to verbalize/acknowledge new learning, Able to retain information, Capable of insight, and Progressing to goal      Endings: None Reported    Modes of Intervention: Support, Socialization, and Exploration      Discipline Responsible: /Counselor      [x] Check in completed and reviewed. Intervention required.  no    Signature:  Electronically signed by MAKSIM Chin, AVINASHW on 1/6/2023 at 3:42 PM

## 2023-01-06 NOTE — PLAN OF CARE
Group Therapy Note    Date: 01/03/2023  Start Time: 1045  End Time: 12:00  Number of Participants: 8      Type of Group: Psychoeducation     Topic:  Self-Compassion and Rachel Liao    Patient's Goal: Pt will participate in activity and discussion and reflect on principles discussed. Pt will complete discussed techniques to use when at home       Note: Patient engaged in discussion about self-compassion and being worthy of being treated a certain way. Patient able to identify and discuss what levels of interpersonal respect they deserve and articulate why. Patient able to identify that they are loved and worthy of that love. Patient struggled to allow themselves the same alem and forgiveness they are shown by those that love them. Patient able to participate in discussion regarding such. Patient able to provide peers with support and encouragement    Status After Intervention:  Improved    Participation Level: Active Listener and Interactive    Participation Quality: Appropriate, Attentive, Sharing, and Supportive      Speech: normal      Thought Process/Content: Logical  Linear      Affective Functioning: Congruent and Flat      Mood: anxious and depressed      Level of consciousness:  Alert, Oriented x4, and Attentive      Response to Learning: Able to verbalize current knowledge/experience, Able to verbalize/acknowledge new learning, Able to retain information, Capable of insight, and Progressing to goal      Endings: None Reported    Modes of Intervention: Education, Support, Socialization, Exploration, Clarifying, and Problem-solving      Discipline Responsible: /Counselor      [x] Check in completed and reviewed. Intervention required.  no    Signature:  Electronically signed by MAKSIM Rodriguez, AZALIA on 1/6/2023 at 12:04 PM

## 2023-01-12 ENCOUNTER — HOSPITAL ENCOUNTER (OUTPATIENT)
Dept: PSYCHIATRY | Age: 43
Setting detail: THERAPIES SERIES
Discharge: HOME | End: 2023-01-12
Payer: COMMERCIAL

## 2023-01-12 ENCOUNTER — HOSPITAL ENCOUNTER (OUTPATIENT)
Dept: PSYCHIATRY | Age: 43
Setting detail: THERAPIES SERIES
End: 2023-01-12
Payer: COMMERCIAL

## 2023-01-16 ENCOUNTER — HOSPITAL ENCOUNTER (OUTPATIENT)
Dept: PSYCHIATRY | Age: 43
Setting detail: THERAPIES SERIES
Discharge: HOME | End: 2023-01-16
Payer: COMMERCIAL

## 2023-01-17 ENCOUNTER — HOSPITAL ENCOUNTER (OUTPATIENT)
Dept: PSYCHIATRY | Age: 43
Setting detail: THERAPIES SERIES
Discharge: HOME | End: 2023-01-17
Payer: COMMERCIAL

## 2023-01-17 NOTE — CARE COORDINATION
Liliane left message on VM @ 6:56 a.m. stating she is Depressed, and cant get out of bed today. GUICHO left message for pt to call if she needs to talk. GUICHO will let Drake know so she can touch base with her tomorrow maybe.

## 2023-01-19 ENCOUNTER — HOSPITAL ENCOUNTER (OUTPATIENT)
Dept: PSYCHIATRY | Age: 43
Setting detail: THERAPIES SERIES
Discharge: HOME | End: 2023-01-19
Payer: COMMERCIAL

## 2023-01-19 DIAGNOSIS — F41.9 ANXIETY: ICD-10-CM

## 2023-01-19 PROCEDURE — 90853 GROUP PSYCHOTHERAPY: CPT

## 2023-01-19 PROCEDURE — 99213 OFFICE O/P EST LOW 20 MIN: CPT | Performed by: PSYCHIATRY & NEUROLOGY

## 2023-01-19 RX ORDER — ARIPIPRAZOLE 5 MG/1
5 TABLET ORAL NIGHTLY
Qty: 30 TABLET | Refills: 0 | Status: SHIPPED | OUTPATIENT
Start: 2023-01-19

## 2023-01-19 RX ORDER — CLONAZEPAM 1 MG/1
1 TABLET ORAL 2 TIMES DAILY
Qty: 60 TABLET | Refills: 0 | Status: SHIPPED | OUTPATIENT
Start: 2023-01-19 | End: 2023-02-18

## 2023-01-19 RX ORDER — FLUOXETINE HYDROCHLORIDE 40 MG/1
40 CAPSULE ORAL DAILY
Qty: 30 CAPSULE | Refills: 0 | Status: SHIPPED | OUTPATIENT
Start: 2023-01-19

## 2023-01-19 RX ORDER — BUPROPION HYDROCHLORIDE 300 MG/1
TABLET ORAL
Qty: 30 TABLET | Refills: 0 | Status: SHIPPED | OUTPATIENT
Start: 2023-01-19

## 2023-01-19 NOTE — PLAN OF CARE
Group Therapy Note     Date: 01/19/2023  Start Time:9 am  End Time: 10:40 am  Number of Participants: 11     Type of Group: Psychotherapy     Patient's Goal:  To increase socialization and improve interpersonal relationships. Notes: Patient was quiet for most part but would share when called upon. Group focused on expression of feelings related to stressors and how they impact mental health symptoms. Themes of group centered on current symptom presentation, interpersonal relationships, and boundaries. She shared some hope re: family counseling being ordered but continues to have almost no contact with dtr or son. She was able to relate to peers and make connections and provide some support and encouragement to peers. She reported slight improvement in mood following participation in group. Status After Intervention: Unchanged     Participation Level: Active Listener and Interactive     Participation Quality: Appropriate, Attentive, Sharing, and Supportive      Speech: normal      Thought Process/Content: Logical and linear      Affective Functioning: Congruent     Mood: anxious and depressed      Level of consciousness:  Alert, Oriented x4, and Attentive      Response to Learning: Able to verbalize current knowledge/experience, Able to verbalize/acknowledge new learning, Able to retain information, Capable of insight, Able to change behavior, and Progressing to goal      Endings: None Reported     Modes of Intervention: Support, Socialization, and Exploration      Discipline Responsible: /Counselor      [x] Check in completed and reviewed. Intervention required.  no

## 2023-01-19 NOTE — PLAN OF CARE
Group Therapy Note    Date: 1/19/2023  Start Time: 1045  End Time: 12:00  Number of Participants: 11      Type of Group: Psychoeducation     Topic:  Coping Skills     Patient's Goal: Pt will participate in activity and discussion and reflect on principles discussed. Pt will complete discussed techniques to use when at home       Notes: Patient open to education about the different types of coping skills: Distraction, Grounding, Emotional Release, Self-Care, Thought Challenge, and Access Your Higher Self. Patient participated in discussion about the pros and cons of each. Patient able to make connections and identify examples of each category in their life. Patient able to participate in creation of a coping skills plan to utilize at home during times of stress/ anxiety for symptom reduction. Patient able to provide support and encouragement to peers. Status After Intervention:  Unchanged    Participation Level: Active Listener and Interactive    Participation Quality: Appropriate, Attentive, Sharing, and Supportive      Speech: normal and hesitant      Thought Process/Content: Logical  Linear  Perseverating      Affective Functioning: Blunted and Flat      Mood: anxious and depressed      Level of consciousness:  Alert, Oriented x4, Attentive, and Preoccupied      Response to Learning: Able to verbalize current knowledge/experience, Able to verbalize/acknowledge new learning, Able to retain information, and Progressing to goal      Endings: None Reported    Modes of Intervention: Education, Support, Socialization, Exploration, Clarifying, and Problem-solving      Discipline Responsible: /Counselor      [x] Check in completed and reviewed. Intervention required.  no    Signature:  Electronically signed by MAKSIM Gonzalez LSW on 1/19/2023 at 2:52 PM

## 2023-01-19 NOTE — PROGRESS NOTES
PSYCHIATRY ATTENDING NOTE    CC: \"Court didn't go well. \"    S: Patient being seen at [address] [place] [address] in follow-up for bipolar depression. Prozac was increased to 40 mg last appointment. Met with patient today to discuss progress with treatment. Liliane reports feeling discouraged over ex not having been reprimanded by the court for not following joint parenting agreement. Patient reports they were ordered to go to family therapy and are scheduled for court again in April. She is also frustrated that her insurance will not cover Charlotte Mancilla; apparently when she was started on this last year she had already reached deductible; even without insurance the medication would still be well over $1,000 a month. We discussed other alternative options and agreed to change patient to Abilify, which even without insurance is less than $10 a month. Discussed work and patient feels that she would be unsuccessful returning to work even part-time right now - says it was a struggle for her to even get to IOP today. Patient was in better spirits by end of session and more optimistic. No acute safety concerns. MSE: Fouzia Goes female appears age. Pleasant, cooperative. Normal psychomotor activity, gait, strength, tone, eye contact. Mood dysphoric. Affect flexible. Speech clear. Thought process organized without loosening of associations. Content future-oriented. No suicidal or homicidal ideations. No paranoia, delusions or hallucinations. Orientation, concentration, recent and remote memory are grossly intact. Fund of knowledge fair. Language use fair. Insight and judgment fair. MEDICATIONS:  Abilify 5 mg nightly (new)  Prozac 40 mg daily (cont)                                 Wellbutrin  mg daily (cont)  Klonopin 1 mg bid prn (cont)    ASSESSMENT: Bipolar II Disorder, depressed     PLAN: Continue IOP and current medications. Much support and reassurance provided.  Will change Latuda to Abilify due to cost. Continue to monitor symptoms, side effects and response to medication. Adjust treatment as needed. See back one week.  Discuss with team.    Electronically signed by Yarelis Louis MD on 1/19/2023 at 11:19 AM

## 2023-01-19 NOTE — PLAN OF CARE
Treatment team meeting held on 1/17/23 in which progress was discussed. Pt has been working on goals of:  -Decrease in depressive symptoms  -Demonstrates healthy coping skills    Pt reports continued struggles with depression and functioning. She reports she hasn't been attending iop in last week because she has been so depressed that she can't get out of bed. .     Plan is for pt to continue attending iop 3 days wkly and to continue seeing Dr Reid Singh as needed for med management. She will also continue seeing her counselor. Court ordered family counseling for her and the children and she is hopeful that it will help.

## 2023-01-24 ENCOUNTER — HOSPITAL ENCOUNTER (OUTPATIENT)
Dept: PSYCHIATRY | Age: 43
Setting detail: THERAPIES SERIES
Discharge: HOME | End: 2023-01-24
Payer: COMMERCIAL

## 2023-01-24 PROCEDURE — 90853 GROUP PSYCHOTHERAPY: CPT

## 2023-01-25 NOTE — PLAN OF CARE
Group Therapy Note    Date: 01/25/2023  Start Time: 8050  End Time: 12:00  Number of Participants: 9      Type of Group: Psychoeducation     Topic: Thought Distortions    Patient's Goal: Pt will participate in activity and discussion and reflect on principles discussed. Pt will complete discussed techniques to use when at home         Notes: Patient open to education about thinking errors, thought distortions, and catastrophizing. Patient engaged in discussion related to negative self-talk, trying to forecast an outcome based on personal bias, and negative labeling. Patient showed insight into their individual situation/ presentation and completed worksheet to use when in stressful situation. Patient provided support and encouragement to peers. Status After Intervention:  Unchanged    Participation Level: Active Listener and Interactive    Participation Quality: Appropriate, Attentive, Sharing, and Supportive      Speech: normal and hesitant      Thought Process/Content: Logical  Linear  Perseverating      Affective Functioning: Congruent, Blunted, and Flat      Mood: anxious, depressed, and irritable      Level of consciousness:  Alert, Oriented x4, Attentive, and Preoccupied      Response to Learning: Able to verbalize current knowledge/experience, Able to verbalize/acknowledge new learning, Able to retain information, and Progressing to goal      Endings: None Reported    Modes of Intervention: Education, Support, Socialization, Exploration, Clarifying, and Problem-solving      Discipline Responsible: /Counselor      [x] Check in completed and reviewed. Intervention required.  no    Signature:  Electronically signed by MAKSIM Valverde, AZALIA on 1/25/2023 at 4:00 PM

## 2023-01-26 ENCOUNTER — HOSPITAL ENCOUNTER (OUTPATIENT)
Dept: PSYCHIATRY | Age: 43
Setting detail: THERAPIES SERIES
End: 2023-01-26
Payer: COMMERCIAL

## 2023-01-31 ENCOUNTER — HOSPITAL ENCOUNTER (OUTPATIENT)
Dept: PSYCHIATRY | Age: 43
Setting detail: THERAPIES SERIES
Discharge: HOME | End: 2023-01-31
Payer: COMMERCIAL

## 2023-01-31 DIAGNOSIS — F41.9 ANXIETY: ICD-10-CM

## 2023-01-31 PROCEDURE — 90853 GROUP PSYCHOTHERAPY: CPT

## 2023-01-31 PROCEDURE — 99213 OFFICE O/P EST LOW 20 MIN: CPT | Performed by: PSYCHIATRY & NEUROLOGY

## 2023-01-31 RX ORDER — OXCARBAZEPINE 150 MG/1
150 TABLET, FILM COATED ORAL 2 TIMES DAILY
Qty: 60 TABLET | Refills: 0 | Status: SHIPPED | OUTPATIENT
Start: 2023-01-31

## 2023-01-31 RX ORDER — CLONAZEPAM 1 MG/1
1 TABLET ORAL 2 TIMES DAILY
Qty: 60 TABLET | Refills: 0 | Status: SHIPPED | OUTPATIENT
Start: 2023-01-31 | End: 2023-03-02

## 2023-01-31 RX ORDER — FLUOXETINE HYDROCHLORIDE 40 MG/1
40 CAPSULE ORAL DAILY
Qty: 30 CAPSULE | Refills: 0 | Status: SHIPPED | OUTPATIENT
Start: 2023-01-31

## 2023-01-31 RX ORDER — BUPROPION HYDROCHLORIDE 300 MG/1
TABLET ORAL
Qty: 30 TABLET | Refills: 0 | Status: SHIPPED | OUTPATIENT
Start: 2023-01-31

## 2023-01-31 NOTE — PROGRESS NOTES
PSYCHIATRY ATTENDING NOTE    CC: \"I am not doing good on the Abilify. \"    S: Patient being seen at [address] [place] [address] in follow-up for bipolar depression. Abilify was started last appointment. Met with patient today to discuss progress with treatment. Liliane presents in fair spirits today. She does complain of some issues with the Abilify such as muscle tension, nausea and mild restlessness. We reviewed previous mood stabilizer trials which have all been antipsychotics; discussed trying an antiepileptic mood stabilizer instead which she was agreeable to. Patient reports she is compartmentalizing family stressors better. She did get her children set up with counseling in accordance with the court order although ex- will still not respond to any communication from her. Patient reports the group sessions are still supportive and therapeutic for her. No acute issues or concerns otherwise. MSE: Mouna Bolaños female appears age. Pleasant, cooperative. Normal psychomotor activity, gait, strength, tone, eye contact. Mood euthymic. Affect flexible. Speech clear. Thought process organized without loosening of associations. Content future-oriented. No suicidal or homicidal ideations. No paranoia, delusions or hallucinations. Orientation, concentration, recent and remote memory are grossly intact. Fund of knowledge fair. Language use fair. Insight and judgment fair. MEDICATIONS:  Trileptal 150 mg bid (new)  Prozac 40 mg daily (cont)                                 Wellbutrin  mg daily (cont)  Klonopin 1 mg bid prn (cont)   Discontinue Abilify    ASSESSMENT: Bipolar II Disorder, depressed     PLAN: Continue IOP. Trial of Trileptal for mood stabilization instead of the Abilify. Support and reassurance provided. Continue to monitor symptoms, side effects and response to medication. Adjust treatment as needed. See back one week.  Discuss with team.    Electronically signed by Beba France MD on 1/31/2023 at 12:25 PM

## 2023-02-02 ENCOUNTER — HOSPITAL ENCOUNTER (OUTPATIENT)
Dept: PSYCHIATRY | Age: 43
Setting detail: THERAPIES SERIES
Discharge: HOME | End: 2023-02-02
Payer: COMMERCIAL

## 2023-02-02 PROCEDURE — 90853 GROUP PSYCHOTHERAPY: CPT

## 2023-02-02 NOTE — PLAN OF CARE
Group Therapy Note    Date: 02/02/2023  Start Time: 1045  End Time: 12:00  Number of Participants: 6      Type of Group: Psychoeducation     Topic:  Strengths    Patient's Goal: Pt will participate in activity and discussion and reflect on principles discussed. Pt will complete discussed techniques to use when at home       Notes: Patient open to education about strengths, positivity and how past thought patterns have helped/ harmed. Patient participated in discussion based around personal strengths, goals, barriers, and specific instances where these constructs existed for them. Patient showed insight into their individual situation and was able to identify protective factors. Patient provided support and encouragement to peers. Status After Intervention:  Improved    Participation Level: Active Listener and Interactive    Participation Quality: Appropriate, Attentive, Sharing, and Supportive      Speech: normal      Thought Process/Content: Logical  Linear      Affective Functioning: Congruent and Flat      Mood: anxious and depressed      Level of consciousness:  Alert, Oriented x4, Attentive, and Preoccupied      Response to Learning: Able to verbalize current knowledge/experience, Able to verbalize/acknowledge new learning, Able to retain information, Capable of insight, and Progressing to goal      Endings: None Reported    Modes of Intervention: Education, Support, Socialization, Exploration, Clarifying, and Problem-solving      Discipline Responsible: /Counselor      [x] Check in completed and reviewed. Intervention required.  no    Signature:  Electronically signed by Vera Bumpers, MSW, LSW on 2/2/2023 at 4:03 PM

## 2023-02-02 NOTE — PLAN OF CARE
Group Therapy Note    Date: 02/02/2023  Start Time: 8:00 am  End Time: 10:30 am  Number of Participants: 6     Type of Group: Psychotherapy     Patient's Goal:  To increase socialization and improve interpersonal relationships. Notes: Patient was active in group focusing on expression of feelings related to stressors and how they impact mental health symptoms. Themes of group centered on current symptom presentation, interpersonal relationships, and boundaries. Patient spoke about current family stressors, health related issues and frustration relating to their mental health. Patient spoke about continuing to increase communication with her children and that her daughter has been coming to the house. She spoke about her continued anxiety and ruminating thoughts. Patient able to make connections and provide support and encouragement to peers. Status After Intervention:  Improved    Participation Level: Active Listener and Interactive    Participation Quality: Appropriate, Attentive, Sharing, and Supportive      Speech: normal      Thought Process/Content: Logical  Linear      Affective Functioning: Congruent and Flat      Mood: anxious and depressed      Level of consciousness:  Alert, Oriented x4, Attentive, and Preoccupied      Response to Learning: Able to verbalize current knowledge/experience, Able to verbalize/acknowledge new learning, Able to retain information, and Progressing to goal      Endings: None Reported    Modes of Intervention: Support, Socialization, and Exploration      Discipline Responsible: /Counselor      [x] Check in completed and reviewed. Intervention required.  no    Signature:  Electronically signed by Reyne Boxer, MSW, AZALIA on 2/2/2023 at 4:01 PM

## 2023-02-02 NOTE — PLAN OF CARE
Group Therapy Note    Date: 01/31/2023  Start Time: 8:00 am  End Time: 10:30 am  Number of Participants: 7     Type of Group: Psychotherapy     Patient's Goal:  To increase socialization and improve interpersonal relationships. Notes: Patient was active in group focusing on expression of feelings related to stressors and how they impact mental health symptoms. Themes of group centered on current symptom presentation, interpersonal relationships, and boundaries. Patient spoke about current family stressors, health related issues and frustration relating to their mental health. Patient spoke about the continuing family conflict and her anxiety relating to it. Patient able to make connections and provide support and encouragement to peers. Status After Intervention:  Improved    Participation Level: Active Listener and Interactive    Participation Quality: Appropriate, Attentive, Sharing, and Supportive      Speech: normal and hesitant      Thought Process/Content: Logical  Linear      Affective Functioning: Congruent and Flat      Mood: anxious and depressed      Level of consciousness:  Alert, Oriented x4, Attentive, and Preoccupied      Response to Learning: Able to verbalize current knowledge/experience, Able to verbalize/acknowledge new learning, Able to retain information, and Progressing to goal      Endings: None Reported    Modes of Intervention: Support, Socialization, and Exploration      Discipline Responsible: /Counselor      [x] Check in completed and reviewed. Intervention required.  no    Signature:  Electronically signed by MAKSIM Howard, AZALIA on 2/2/2023 at 1:44 PM

## 2023-02-02 NOTE — PLAN OF CARE
Group Therapy Note    Date: 01/31/2023  Start Time: 1045  End Time: 12:00  Number of Participants: 7      Type of Group: Psychoeducation     Topic:  Self-Sabotage    Patient's Goal: Pt will participate in activity and discussion and reflect on principles discussed. Pt will complete discussed techniques to use when at home       Notes: Patient participated in discussion related to self-sabotage. Patient was educated about behavioral habits, CBT exercises, identifying self-sabotaging behaviors, and how to map out the connections using CBT techniques. Patient able to make connections with the material, identify behaviors they were unaware were self-sabotaging, and map out the progression of a behavior they identified with. Patient stated an understanding and was able to make connections to material and peers. Status After Intervention:  Improved    Participation Level: Active Listener and Interactive    Participation Quality: Appropriate, Attentive, Sharing, and Supportive      Speech: normal      Thought Process/Content: Logical  Linear      Affective Functioning: Congruent and Flat      Mood: anxious and depressed      Level of consciousness:  Alert, Oriented x4, Attentive, and Preoccupied      Response to Learning: Able to verbalize current knowledge/experience, Able to verbalize/acknowledge new learning, Able to retain information, and Progressing to goal      Endings: None Reported    Modes of Intervention: Education, Support, Socialization, Exploration, Clarifying, and Problem-solving      Discipline Responsible: /Counselor      [x] Check in completed and reviewed. Intervention required.  no    Signature:  Electronically signed by MAKSIM Mcdonald, AZALIA on 2/2/2023 at 1:45 PM

## 2023-02-07 ENCOUNTER — HOSPITAL ENCOUNTER (OUTPATIENT)
Dept: PSYCHIATRY | Age: 43
Setting detail: THERAPIES SERIES
End: 2023-02-07
Payer: COMMERCIAL

## 2023-02-09 ENCOUNTER — HOSPITAL ENCOUNTER (OUTPATIENT)
Dept: PSYCHIATRY | Age: 43
Setting detail: THERAPIES SERIES
Discharge: HOME | End: 2023-02-09
Payer: COMMERCIAL

## 2023-02-09 PROCEDURE — 90853 GROUP PSYCHOTHERAPY: CPT

## 2023-02-09 NOTE — PLAN OF CARE
Group Therapy Note    Date: 02/09/2023  Start Time: 1045  End Time: 12:00  Number of Participants: 7      Type of Group: Psychoeducation     Topic:  Habit Forming      Patient's Goal: Pt will participate in activity and discussion and reflect on principles discussed. Pt will complete discussed techniques to use when at home         Notes: Patient participated in discussion centered around habits, habit tracking and the benefits of having a routine. Patient educated about the mental health benefits of habit tracking, the visual manifestation of success, and the effect it has on increasing self-esteem. Patient completed individualized habit tracker with personalized habits to initiate/ track. Patient participated in discussion about barriers and how to challenges them. Patient able to provide support and encouragement to peers    Status After Intervention:  Unchanged    Participation Level: Active Listener and Minimal    Participation Quality: Attentive and Resistant      Speech: hesitant and mute      Thought Process/Content: Linear  Perseverating      Affective Functioning: Blunted, Flat, and Constricted/Restricted      Mood: angry, anxious, depressed, and irritable      Level of consciousness:  Alert, Oriented x4, Preoccupied, and Inattentive      Response to Learning: Able to verbalize current knowledge/experience, Able to verbalize/acknowledge new learning, Able to retain information, Capable of insight, and Progressing to goal      Endings: None Reported    Modes of Intervention: Education, Support, Socialization, Exploration, Clarifying, and Problem-solving      Discipline Responsible: /Counselor      [x] Check in completed and reviewed. Intervention required.  no    Signature:  Electronically signed by MAKSIM Solorio, AZALIA on 2/9/2023 at 4:05 PM

## 2023-02-09 NOTE — PLAN OF CARE
Group Therapy Note    Date: 02/09/2023  Start Time: 8:00 am  End Time: 10:30 am  Number of Participants: 7     Type of Group: Psychotherapy     Patient's Goal:  To increase socialization and improve interpersonal relationships. Notes: Patient was active in group focusing on expression of feelings related to stressors and how they impact mental health symptoms. Themes of group centered on current symptom presentation, interpersonal relationships, and boundaries. Patient spoke about current family stressors, health related issues and frustration relating to their mental health. Patient presented as frustrated, tearful, and irritable. Patient stated that while she had good news that her SSDI was approved she had a negative interaction with her children. She states she was \"venting\" to her son about her daughter and a friend of her son's texted the daughter that she was saying bad things about her. This brought the patient great distress and upset. Patient states her daughter will not speak with her anymore and she has still had no contact with her middle son. Patient able to make connections and provide support and encouragement to peers.      Status After Intervention:  Unchanged    Participation Level: Interactive    Participation Quality: Attentive and Sharing      Speech: pressured and loud      Thought Process/Content: Linear  Perseverating      Affective Functioning: Blunted, Flat, and Constricted/Restricted      Mood: angry, anxious, depressed, and irritable      Level of consciousness:  Alert, Oriented x4, Attentive, and Preoccupied      Response to Learning: Able to verbalize current knowledge/experience, Able to verbalize/acknowledge new learning, Able to retain information, Capable of insight, and Progressing to goal      Endings: None Reported    Modes of Intervention: Support, Socialization, and Exploration      Discipline Responsible: /Counselor      [x] Check in completed and reviewed. Intervention required.  no    Signature: Electronically signed by MAKSIM Evans, AVINASHW on 2/9/2023 at 3:59 PM

## 2023-02-21 ENCOUNTER — HOSPITAL ENCOUNTER (OUTPATIENT)
Dept: PSYCHIATRY | Age: 43
Setting detail: THERAPIES SERIES
End: 2023-02-21
Payer: COMMERCIAL

## 2023-02-27 ENCOUNTER — HOSPITAL ENCOUNTER (OUTPATIENT)
Dept: PSYCHIATRY | Age: 43
Setting detail: THERAPIES SERIES
Discharge: HOME | End: 2023-02-27
Payer: COMMERCIAL

## 2023-02-27 DIAGNOSIS — F41.9 ANXIETY: ICD-10-CM

## 2023-02-27 PROCEDURE — 90853 GROUP PSYCHOTHERAPY: CPT

## 2023-02-27 PROCEDURE — 99213 OFFICE O/P EST LOW 20 MIN: CPT | Performed by: PSYCHIATRY & NEUROLOGY

## 2023-02-27 RX ORDER — OXCARBAZEPINE 300 MG/1
300 TABLET, FILM COATED ORAL 2 TIMES DAILY
Qty: 60 TABLET | Refills: 0 | Status: SHIPPED | OUTPATIENT
Start: 2023-02-27

## 2023-02-27 RX ORDER — CLONAZEPAM 1 MG/1
1 TABLET ORAL 3 TIMES DAILY PRN
Qty: 90 TABLET | Refills: 0 | Status: SHIPPED | OUTPATIENT
Start: 2023-02-27 | End: 2023-03-29

## 2023-02-27 RX ORDER — FLUOXETINE HYDROCHLORIDE 40 MG/1
40 CAPSULE ORAL DAILY
Qty: 30 CAPSULE | Refills: 0 | Status: SHIPPED | OUTPATIENT
Start: 2023-02-27

## 2023-02-27 RX ORDER — BUPROPION HYDROCHLORIDE 300 MG/1
TABLET ORAL
Qty: 30 TABLET | Refills: 0 | Status: SHIPPED | OUTPATIENT
Start: 2023-02-27

## 2023-02-27 NOTE — PROGRESS NOTES
PSYCHIATRY ATTENDING NOTE    CC: \"Not good. I am miserable. \"    S: Patient being seen at [address] [place] [address] in follow-up for bipolar depression. Trileptal was started last appointment. Met with patient today to discuss progress with treatment. Liliane presents dysphoric and anxious today. She continues to struggle in compartmentalizing situation with two older kids which we discussed in detail. She reports she obsesses and ruminates over this situation. She has fair insight in knowing she should be focusing instead on her family at home and herself; however, she has just been unable to mentally disengage from the other situation. Recommended discussing topic of struggling to compartmentalize stressors in group therapy which patient was agreeable with. We also discussed further optimization of Trileptal and Klonopin to help with racing thoughts especially at night. No acute safety concerns. MSE: Kosta May female appears age. Pleasant, cooperative. Normal psychomotor activity, gait, strength, tone, eye contact. Mood anxious. Affect congruent. Speech clear. Thought process organized without loosening of associations. Content future-oriented. No suicidal or homicidal ideations. No paranoia, delusions or hallucinations. Orientation, concentration, recent and remote memory are grossly intact. Fund of knowledge fair. Language use fair. Insight and judgment fair. MEDICATIONS:  Trileptal 300 mg bid (increasing)   Prozac 40 mg daily (cont)                                 Wellbutrin  mg daily (cont)  Klonopin 1 mg tid prn anxiety (increasing)    ASSESSMENT: Bipolar II Disorder, depressed     PLAN: Continue IOP and current medications. Optimize Trileptal and Klonopin. Continue to monitor symptoms, side effects and response to medication. Adjust treatment as needed. See back one week.  Discuss with team.    Electronically signed by Mukesh Fuentes MD on 2/27/2023 at 12:56 PM

## 2023-02-27 NOTE — PLAN OF CARE
Group Therapy Note    Date: 02/27/2023  Start Time: 1045  End Time: 12:00  Number of Participants: 7      Type of Group: Psychoeducation     Topic:  Depression and Behavioral Activism      Patient's Goal: Pt will participate in activity and discussion and reflect on principles discussed. Pt will complete discussed techniques to use when at home          Notes: Patient participated in discussion related to depression and the modality of behavioral activism. Patient educated about the major sx of depression, demographics, at risk populations, treatment modalities, and additional facts. Patient educated about the treatment modality of behavioral activism. Patient participated in activity where they identified tasks they enjoy doing, tasks they do not enjoy doing and how to track feelings of depression, pleasant feelings, and sense of achievement. Patient identified 3 task to complete this week and to track their feeling both before and after task completion. Patient stated an understanding    Status After Intervention:  Unchanged    Participation Level: Active Listener and Interactive    Participation Quality: Appropriate, Attentive, Sharing, and Supportive      Speech: normal and hesitant      Thought Process/Content: Logical  Linear      Affective Functioning: Congruent, Blunted, and Flat      Mood: anxious and depressed      Level of consciousness:  Alert, Oriented x4, Attentive, and Preoccupied      Response to Learning: Able to verbalize current knowledge/experience, Able to verbalize/acknowledge new learning, Able to retain information, and Capable of insight      Endings: None Reported    Modes of Intervention: Education, Support, Socialization, Exploration, Clarifying, and Problem-solving      Discipline Responsible: /Counselor      [x] Check in completed and reviewed. Intervention required.  no    Signature:  Electronically signed by MAKSIM Almonte, AZALIA on 2/27/2023 at 3:49 PM

## 2023-02-27 NOTE — PLAN OF CARE
Group Therapy Note    Date: 02/27/2023  Start Time: 8:00 am  End Time: 10:30 am  Number of Participants: 7     Type of Group: Psychotherapy     Patient's Goal:  To increase socialization and improve interpersonal relationships. Notes: Patient was active in group focusing on expression of feelings related to stressors and how they impact mental health symptoms. Themes of group centered on current symptom presentation, interpersonal relationships, and boundaries. Patient spoke about current family stressors, health related issues and frustration relating to their mental health. Patient spoke about continued conflict with her family and increased depression. Patient states she was unable to get out of bed last week due to her depression. Patient continues to struggle with not being in control of her children. Patient able to make connections and provide support and encouragement to peers. Status After Intervention:  Unchanged    Participation Level: Active Listener and Interactive    Participation Quality: Appropriate, Attentive, Sharing, and Supportive      Speech: normal and hesitant      Thought Process/Content: Logical  Linear      Affective Functioning: Congruent and Flat      Mood: anxious and depressed      Level of consciousness:  Alert, Oriented x4, Attentive, and Preoccupied      Response to Learning: Able to verbalize current knowledge/experience, Able to verbalize/acknowledge new learning, Able to retain information, and Capable of insight      Endings: None Reported    Modes of Intervention: Support, Socialization, and Exploration      Discipline Responsible: /Counselor      [x] Check in completed and reviewed. Intervention required.  no    Signature:  Electronically signed by MAKSIM Lindo, AZALIA on 2/27/2023 at 3:48 PM

## 2023-02-28 ENCOUNTER — HOSPITAL ENCOUNTER (OUTPATIENT)
Dept: PSYCHIATRY | Age: 43
Setting detail: THERAPIES SERIES
Discharge: HOME | End: 2023-02-28
Payer: COMMERCIAL

## 2023-02-28 PROCEDURE — 90853 GROUP PSYCHOTHERAPY: CPT

## 2023-02-28 RX ORDER — OXCARBAZEPINE 150 MG/1
TABLET, FILM COATED ORAL
Qty: 60 TABLET | Refills: 0 | OUTPATIENT
Start: 2023-02-28

## 2023-03-02 NOTE — PLAN OF CARE
Group Therapy Note    Date: 02/28/2023  Start Time: 8:00 am  End Time: 10:30 am  Number of Participants: 7     Type of Group: Psychotherapy     Patient's Goal:  To increase socialization and improve interpersonal relationships. Notes: Patient was active in group focusing on expression of feelings related to stressors and how they impact mental health symptoms. Themes of group centered on current symptom presentation, interpersonal relationships, and boundaries. Patient spoke about current family stressors, health related issues and frustration relating to their mental health. Patient spoke about her perseveration regarding her children. Patient states she knows she needs to make a decision but is unable to decide. Patient able to make connections and provide support and encouragement to peers. Status After Intervention:  Unchanged    Participation Level: Active Listener and Interactive    Participation Quality: Appropriate, Attentive, Sharing, and Supportive      Speech: normal and pressured      Thought Process/Content: Logical  Linear  Perseverating      Affective Functioning: Blunted and Flat      Mood: anxious and depressed      Level of consciousness:  Alert, Oriented x4, Attentive, and Preoccupied      Response to Learning: Able to verbalize current knowledge/experience, Able to verbalize/acknowledge new learning, and Able to retain information      Endings: None Reported    Modes of Intervention: Support, Socialization, and Exploration      Discipline Responsible: /Counselor      [x] Check in completed and reviewed. Intervention required.  no    Signature:  Electronically signed by MAKSIM Arana LSW on 3/2/2023 at 2:55 PM

## 2023-03-02 NOTE — PLAN OF CARE
Group Therapy Note    Date: 02/28/2023  Start Time: 1045  End Time: 12:00  Number of Participants: 7      Type of Group: Psychoeducation     Topic:  Values      Patient's Goal: Pt will participate in activity and discussion and reflect on principles discussed. Pt will complete discussed techniques to use when at home          Notes: Patient participated in discussion related to values, how they evolve through our life, how they are formed, and how they affect decisions. Patients discussed if our values change depending on who we are around, how our values may affect others, and how a change in values can alter out self-perception. Patient stated an understanding and was able to make connections to material and peers. Status After Intervention:  Improved    Participation Level: Active Listener and Interactive    Participation Quality: Appropriate, Attentive, Sharing, and Supportive      Speech: normal and hesitant      Thought Process/Content: Logical  Linear      Affective Functioning: Congruent and Flat      Mood: anxious and depressed      Level of consciousness:  Alert, Oriented x4, and Attentive      Response to Learning: Able to verbalize current knowledge/experience, Able to verbalize/acknowledge new learning, Able to retain information, and Progressing to goal      Endings: None Reported    Modes of Intervention: Education, Support, Socialization, Exploration, Clarifying, and Problem-solving      Discipline Responsible: /Counselor      [x] Check in completed and reviewed. Intervention required.  no    Signature:  Electronically signed by MAKSIM Jaimes, AZALIA on 3/2/2023 at 3:21 PM

## 2023-03-06 ENCOUNTER — HOSPITAL ENCOUNTER (OUTPATIENT)
Dept: PSYCHIATRY | Age: 43
Setting detail: THERAPIES SERIES
Discharge: HOME | End: 2023-03-06
Payer: COMMERCIAL

## 2023-03-06 PROCEDURE — 90853 GROUP PSYCHOTHERAPY: CPT

## 2023-03-06 RX ORDER — OXCARBAZEPINE 300 MG/1
TABLET, FILM COATED ORAL
Qty: 60 TABLET | Refills: 0 | OUTPATIENT
Start: 2023-03-06

## 2023-03-06 RX ORDER — LURASIDONE HYDROCHLORIDE 20 MG/1
TABLET, FILM COATED ORAL
Qty: 30 TABLET | Refills: 0 | OUTPATIENT
Start: 2023-03-06

## 2023-03-06 NOTE — PLAN OF CARE
Group Therapy Note     Date: 3/06/2023  Start Time:845 am  End Time: 10:45 am  Number of Participants: 10     Type of Group: Psychotherapy     Patient's Goal:  To increase socialization and improve interpersonal relationships. Notes: Patient was active in group focusing on expression of feelings related to stressors and how they impact mental health symptoms. Themes of group centered on current symptom presentation, interpersonal relationships, and boundaries. Group also addressed feelings of guilt r/t not living in line with our values and carrying out our role's perceived responsibilities. Grief was also discussed r/t suicides completed by family or friends of group members and how that grief can cause us to pull away from others which causes further grief and loneliness d/t erosion of other relationships. She states that she was unable to get out of bed on Friday but that she had a good day yesterday with her husb and 2 children. She made positive connections with others through sharing personal issues and providing support and encouragement to others. Status After Intervention: Unchanged     Participation Level: Active Listener and Interactive     Participation Quality: Appropriate, Attentive, Sharing, and Supportive      Speech: normal      Thought Process/Content: Logical and linear      Affective Functioning: Congruent     Mood: Depressed and anxious     Level of consciousness:  Alert, Oriented x4, and Attentive      Response to Learning: Able to verbalize current knowledge/experience, Able to verbalize/acknowledge new learning, Able to retain information, Capable of insight, Able to change behavior, and Progressing to goal.      Endings: None Reported     Modes of Intervention: Support, Socialization, and Exploration      Discipline Responsible: /Counselor      [x] Check in completed and reviewed. Intervention required.  no

## 2023-03-06 NOTE — PLAN OF CARE
Group Therapy Note     Date: 3/06/2023  Start Time: 1100  End Time: 12:10  Number of Participants: 10     Type of Group: Psychoeducation     Topic: Thought Defusion, Perfectionism vs. Striving to do our best, and Countering Negative Thoughts. Mindfulness/relaxation exercise and tapping exercise. Patient's Goal: Pt will participate in discussion and reflect on above topic. She will be able to identify personal examples of negative thoughts and be able to identify a rational counter statement. Pt will be able to participate in mindfulness/relaxation and tapping exercises and report some improvement in mood following participation. Notes: Pt was an active participant in mindfulness/relaxation and tapping activities. She reported no change in mood following participation in group. She was able to identify negative thoughts and challenge them with rational counter statements. Pt engaged without difficulty, was able to share own experiences, and was open to learning new information. Status After Intervention: Unchanged     Participation Level: Active Listener and Interactive     Participation Quality: Appropriate, Attentive, Sharing, and Supportive      Speech: normal      Thought Process/Content: Logical and linear     Affective Functioning: Congruent     Mood: Depressed and anxious     Level of consciousness:  Alert, Oriented x4, and Attentive      Response to Learning: Able to verbalize current knowledge/experience, Able to verbalize/acknowledge new learning, Able to retain information, Capable of insight, and Progressing to goal.     Endings: None Reported     Modes of Intervention: Education, Support, Exploration, and Activity. Discipline Responsible: /Counselor       X Check in completed and reviewed. Intervention required.  no

## 2023-03-07 ENCOUNTER — HOSPITAL ENCOUNTER (OUTPATIENT)
Dept: PSYCHIATRY | Age: 43
Setting detail: THERAPIES SERIES
Discharge: HOME | End: 2023-03-07
Payer: COMMERCIAL

## 2023-03-07 NOTE — CARE COORDINATION
Pt called off for 3/07 stating she was having a rough day and can't get out of bed today. She states she has a dr appt on Thurs and therefore won't be able to make iop on 3/09.

## 2023-03-13 ENCOUNTER — HOSPITAL ENCOUNTER (OUTPATIENT)
Dept: PSYCHIATRY | Age: 43
Setting detail: THERAPIES SERIES
End: 2023-03-13
Payer: COMMERCIAL

## 2023-03-14 ENCOUNTER — HOSPITAL ENCOUNTER (OUTPATIENT)
Dept: PSYCHIATRY | Age: 43
Setting detail: THERAPIES SERIES
Discharge: HOME | End: 2023-03-14
Payer: COMMERCIAL

## 2023-03-14 NOTE — CARE COORDINATION
GUICHO spoke with patient who states she has not been attending as she is working to compile documents. She states her ex  is seeking full legal custody through the court and patient is \"trying to show that I'm not a bad mom\". Patient requested letter stating attendance at IOP/ doctor visits and, if able, a statement of her progress. SW states she will put a letter together and have it for patient Thursday. Patient state she will be back to IOP Thurs as planned.      Electronically signed by MAKSIM Howard, AZALIA on 3/14/2023 at 11:24 AM

## 2023-03-16 ENCOUNTER — HOSPITAL ENCOUNTER (OUTPATIENT)
Dept: PSYCHIATRY | Age: 43
Setting detail: THERAPIES SERIES
Discharge: HOME | End: 2023-03-16
Payer: COMMERCIAL

## 2023-03-16 NOTE — CARE COORDINATION
SW informed by  that patient had called in after 9 am stating she was not feeling well and would not be attending IOP today. Patient has not attended IOP since 3/6/2023.      Electronically signed by MAKSIM Howard LSW on 3/16/2023 at 11:36 AM

## 2023-03-21 ENCOUNTER — APPOINTMENT (OUTPATIENT)
Dept: PSYCHIATRY | Age: 43
End: 2023-03-21
Payer: COMMERCIAL

## 2023-03-23 ENCOUNTER — HOSPITAL ENCOUNTER (OUTPATIENT)
Dept: PSYCHIATRY | Age: 43
Setting detail: THERAPIES SERIES
End: 2023-03-23
Payer: COMMERCIAL

## 2023-03-24 PROBLEM — N72 CERVICITIS: Status: ACTIVE | Noted: 2023-03-24

## 2023-03-24 PROBLEM — N39.0 LOWER URINARY TRACT INFECTIOUS DISEASE: Status: ACTIVE | Noted: 2023-03-24

## 2023-03-24 PROBLEM — D64.9 ANEMIA: Status: ACTIVE | Noted: 2023-03-24

## 2023-03-24 PROBLEM — K42.0 UMBILICAL HERNIA WITH OBSTRUCTION: Status: ACTIVE | Noted: 2023-03-24

## 2023-03-24 PROBLEM — R35.0 URINE FREQUENCY: Status: ACTIVE | Noted: 2023-03-24

## 2023-03-24 PROBLEM — N64.4 BREAST PAIN: Status: ACTIVE | Noted: 2023-03-24

## 2023-03-24 PROBLEM — R94.6 THYROID FUNCTION TEST ABNORMAL: Status: ACTIVE | Noted: 2023-03-24

## 2023-03-24 PROBLEM — N90.89 VULVAR LESION: Status: ACTIVE | Noted: 2023-03-24

## 2023-03-24 PROBLEM — N93.9 ABNORMAL UTERINE BLEEDING (AUB): Status: ACTIVE | Noted: 2023-03-24

## 2023-03-24 PROBLEM — N92.0 MENORRHAGIA: Status: ACTIVE | Noted: 2023-03-24

## 2023-03-24 PROBLEM — O02.1 MISSED ABORTION (HHS-HCC): Status: ACTIVE | Noted: 2023-03-24

## 2023-03-24 PROBLEM — K21.9 GERD (GASTROESOPHAGEAL REFLUX DISEASE): Status: ACTIVE | Noted: 2023-03-24

## 2023-03-24 PROBLEM — R73.09 ELEVATED GLUCOSE: Status: ACTIVE | Noted: 2023-03-24

## 2023-03-24 PROBLEM — N95.1 MENOPAUSAL SYMPTOMS: Status: ACTIVE | Noted: 2023-03-24

## 2023-03-24 PROBLEM — D68.61 ANTIPHOSPHOLIPID SYNDROME (MULTI): Status: ACTIVE | Noted: 2023-03-24

## 2023-03-24 PROBLEM — A60.00 HERPES GENITALIA: Status: ACTIVE | Noted: 2023-03-24

## 2023-03-24 PROBLEM — N89.8 VAGINAL DISCHARGE: Status: ACTIVE | Noted: 2023-03-24

## 2023-03-24 PROBLEM — E55.9 VITAMIN D DEFICIENCY: Status: ACTIVE | Noted: 2023-03-24

## 2023-03-24 PROBLEM — R68.89 HEAT INTOLERANCE: Status: ACTIVE | Noted: 2023-03-24

## 2023-03-24 PROBLEM — R19.7 DIARRHEA: Status: ACTIVE | Noted: 2023-03-24

## 2023-03-24 PROBLEM — R45.4 IRRITABILITY: Status: ACTIVE | Noted: 2023-03-24

## 2023-03-24 PROBLEM — O99.210 OBESITY IN PREGNANCY (HHS-HCC): Status: ACTIVE | Noted: 2023-03-24

## 2023-03-24 PROBLEM — Z86.79 H/O: HTN (HYPERTENSION): Status: ACTIVE | Noted: 2023-03-24

## 2023-03-24 PROBLEM — F41.9 ANXIETY: Status: ACTIVE | Noted: 2023-03-24

## 2023-03-24 PROBLEM — R11.0 NAUSEA IN ADULT: Status: ACTIVE | Noted: 2023-03-24

## 2023-03-24 PROBLEM — R42 VERTIGO: Status: ACTIVE | Noted: 2023-03-24

## 2023-03-24 PROBLEM — R79.9 ABNORMAL BLOOD CHEMISTRY: Status: ACTIVE | Noted: 2023-03-24

## 2023-03-24 PROBLEM — N92.6 IRREGULAR PERIODS: Status: ACTIVE | Noted: 2023-03-24

## 2023-03-24 RX ORDER — ONDANSETRON 4 MG/1
TABLET, ORALLY DISINTEGRATING ORAL
COMMUNITY
Start: 2022-09-20 | End: 2023-03-27 | Stop reason: ALTCHOICE

## 2023-03-24 RX ORDER — LURASIDONE HYDROCHLORIDE 60 MG/1
1 TABLET, FILM COATED ORAL DAILY
COMMUNITY
Start: 2022-09-20 | End: 2023-03-27 | Stop reason: ALTCHOICE

## 2023-03-24 RX ORDER — FAMOTIDINE 20 MG/1
TABLET, FILM COATED ORAL
COMMUNITY
Start: 2022-08-03 | End: 2023-03-27 | Stop reason: ALTCHOICE

## 2023-03-24 RX ORDER — BUPROPION HYDROCHLORIDE 150 MG/1
TABLET ORAL
COMMUNITY
Start: 2022-09-20 | End: 2023-03-27 | Stop reason: ALTCHOICE

## 2023-03-24 RX ORDER — CLONAZEPAM 1 MG/1
1 TABLET ORAL 2 TIMES DAILY
COMMUNITY
Start: 2022-09-20

## 2023-03-27 ENCOUNTER — HOSPITAL ENCOUNTER (OUTPATIENT)
Dept: PSYCHIATRY | Age: 43
Setting detail: THERAPIES SERIES
End: 2023-03-27
Payer: COMMERCIAL

## 2023-03-27 ENCOUNTER — OFFICE VISIT (OUTPATIENT)
Dept: PRIMARY CARE | Facility: CLINIC | Age: 43
End: 2023-03-27
Payer: COMMERCIAL

## 2023-03-27 VITALS
HEIGHT: 69 IN | WEIGHT: 229.2 LBS | BODY MASS INDEX: 33.95 KG/M2 | SYSTOLIC BLOOD PRESSURE: 122 MMHG | DIASTOLIC BLOOD PRESSURE: 85 MMHG | HEART RATE: 76 BPM

## 2023-03-27 DIAGNOSIS — F41.9 ANXIETY: ICD-10-CM

## 2023-03-27 DIAGNOSIS — R19.7 DIARRHEA, UNSPECIFIED TYPE: ICD-10-CM

## 2023-03-27 DIAGNOSIS — K21.9 GASTROESOPHAGEAL REFLUX DISEASE WITHOUT ESOPHAGITIS: Primary | ICD-10-CM

## 2023-03-27 PROCEDURE — 3074F SYST BP LT 130 MM HG: CPT | Performed by: REGISTERED NURSE

## 2023-03-27 PROCEDURE — 99214 OFFICE O/P EST MOD 30 MIN: CPT | Performed by: REGISTERED NURSE

## 2023-03-27 PROCEDURE — 3079F DIAST BP 80-89 MM HG: CPT | Performed by: REGISTERED NURSE

## 2023-03-27 RX ORDER — FAMOTIDINE 20 MG/1
20 TABLET, FILM COATED ORAL DAILY
Qty: 90 TABLET | Refills: 3 | Status: SHIPPED | OUTPATIENT
Start: 2023-03-27 | End: 2024-01-10

## 2023-03-27 RX ORDER — FLUOXETINE HYDROCHLORIDE 40 MG/1
1 CAPSULE ORAL DAILY
COMMUNITY
Start: 2023-03-21

## 2023-03-27 RX ORDER — OXCARBAZEPINE 300 MG/1
1 TABLET, FILM COATED ORAL 2 TIMES DAILY
COMMUNITY
Start: 2023-02-27

## 2023-03-27 RX ORDER — BUPROPION HYDROCHLORIDE 300 MG/1
300 TABLET ORAL
COMMUNITY

## 2023-03-27 RX ORDER — FAMOTIDINE 20 MG/1
TABLET, FILM COATED ORAL
COMMUNITY
End: 2023-03-27 | Stop reason: SDUPTHER

## 2023-03-27 ASSESSMENT — ENCOUNTER SYMPTOMS
WOUND: 0
DIZZINESS: 0
DIARRHEA: 0
RHINORRHEA: 0
WHEEZING: 0
FEVER: 0
DIFFICULTY URINATING: 0
FREQUENCY: 0
SHORTNESS OF BREATH: 0
EYE DISCHARGE: 0
COUGH: 0
EYE REDNESS: 0
CONSTIPATION: 0
ABDOMINAL PAIN: 0
WEAKNESS: 0
CONFUSION: 0
CHILLS: 0
VOMITING: 0
NAUSEA: 0
HEADACHES: 0
NERVOUS/ANXIOUS: 0

## 2023-03-27 NOTE — PROGRESS NOTES
"Subjective   Patient ID: Chery Matias is a 42 y.o. female who presents for Follow-up (Follow up to get refills on famotidine. ).    HPI   GERD: Has been using famotidine and this has been helping. Would like a refill today.      Diarrhea: Quit drinking redbull. She was drinking 3 large ones per day. This resolved her diarrhea.      Anxiety/Depression: Dr. Cruz at [facility]. Was with Dr. ANTOINE for 6 years. Recently started seeing Dr. LIM for the past 3 months.      She has an 8 year old son, lives in [place].      All other systems reviewed and negative for complaint unless stated above.     Surgery: Hernia repair with Dr. Bray 9/2021. D&Cs x 4. She had miscarriages, tonsils  Family: Depression/anxiety, heart issues maternal grandfather.   smoker: yes, ppd, started when she was around 20, quit for 4 years.   Etoh: rarely on occasion   Drug use: none      Mammogram: April 2022   PAP: Following with RAMIREZ Davila. Due 2023     Review of Systems   Constitutional:  Negative for chills and fever.   HENT:  Negative for congestion, ear pain and rhinorrhea.    Eyes:  Negative for discharge and redness.   Respiratory:  Negative for cough, shortness of breath and wheezing.    Cardiovascular:  Negative for chest pain and leg swelling.   Gastrointestinal:  Negative for abdominal pain, constipation, diarrhea, nausea and vomiting.   Genitourinary:  Negative for difficulty urinating, frequency and urgency.   Musculoskeletal:  Negative for gait problem.   Skin:  Negative for rash and wound.   Neurological:  Negative for dizziness, weakness and headaches.   Psychiatric/Behavioral:  Negative for confusion. The patient is not nervous/anxious.        Objective   /85 (BP Location: Left arm)   Pulse 76   Ht 1.753 m (5' 9\")   Wt 104 kg (229 lb 3.2 oz)   BMI 33.85 kg/m²     Physical Exam  Vitals reviewed.   Constitutional:       Appearance: Normal appearance.   HENT:      Head: Normocephalic.      Right Ear: Tympanic " membrane, ear canal and external ear normal.      Left Ear: Tympanic membrane, ear canal and external ear normal.      Nose: No rhinorrhea.      Mouth/Throat:      Mouth: Mucous membranes are moist.      Pharynx: Oropharynx is clear.   Eyes:      Pupils: Pupils are equal, round, and reactive to light.   Cardiovascular:      Rate and Rhythm: Normal rate and regular rhythm.      Pulses: Normal pulses.   Pulmonary:      Effort: Pulmonary effort is normal.      Breath sounds: Normal breath sounds.   Abdominal:      General: Abdomen is flat. Bowel sounds are normal.      Palpations: Abdomen is soft.   Musculoskeletal:         General: No tenderness. Normal range of motion.      Right lower leg: No edema.      Left lower leg: No edema.   Lymphadenopathy:      Cervical: No cervical adenopathy.   Skin:     General: Skin is warm and dry.      Findings: No rash.   Neurological:      Mental Status: She is alert and oriented to person, place, and time.   Psychiatric:         Mood and Affect: Mood normal.         Behavior: Behavior normal.       Assessment/Plan        #GERD  rx famotidine to continue      #Anxiety  #Depression   continue following with psych   continue bupropion, clonazepam, latuda, trintellix    #HCM  Following with OBGYN for mammogram and PAPs   Blood work from September wnl, monitor

## 2023-03-28 ENCOUNTER — APPOINTMENT (OUTPATIENT)
Dept: PSYCHIATRY | Age: 43
End: 2023-03-28
Payer: COMMERCIAL

## 2023-03-30 ENCOUNTER — APPOINTMENT (OUTPATIENT)
Dept: PSYCHIATRY | Age: 43
End: 2023-03-30
Payer: COMMERCIAL

## 2023-10-02 ENCOUNTER — HOSPITAL ENCOUNTER (OUTPATIENT)
Dept: RADIOLOGY | Facility: HOSPITAL | Age: 43
Discharge: HOME | End: 2023-10-02
Payer: COMMERCIAL

## 2023-10-02 ENCOUNTER — OFFICE VISIT (OUTPATIENT)
Dept: PRIMARY CARE | Facility: CLINIC | Age: 43
End: 2023-10-02
Payer: COMMERCIAL

## 2023-10-02 VITALS
WEIGHT: 217.4 LBS | SYSTOLIC BLOOD PRESSURE: 121 MMHG | HEIGHT: 69 IN | BODY MASS INDEX: 32.2 KG/M2 | DIASTOLIC BLOOD PRESSURE: 86 MMHG | OXYGEN SATURATION: 98 % | HEART RATE: 86 BPM

## 2023-10-02 DIAGNOSIS — M54.50 CHRONIC MIDLINE LOW BACK PAIN WITHOUT SCIATICA: Primary | ICD-10-CM

## 2023-10-02 DIAGNOSIS — G89.29 CHRONIC MIDLINE LOW BACK PAIN WITHOUT SCIATICA: Primary | ICD-10-CM

## 2023-10-02 DIAGNOSIS — G89.29 CHRONIC MIDLINE LOW BACK PAIN WITHOUT SCIATICA: ICD-10-CM

## 2023-10-02 DIAGNOSIS — M54.50 CHRONIC MIDLINE LOW BACK PAIN WITHOUT SCIATICA: ICD-10-CM

## 2023-10-02 PROCEDURE — 99213 OFFICE O/P EST LOW 20 MIN: CPT | Performed by: REGISTERED NURSE

## 2023-10-02 PROCEDURE — 3074F SYST BP LT 130 MM HG: CPT | Performed by: REGISTERED NURSE

## 2023-10-02 PROCEDURE — 3079F DIAST BP 80-89 MM HG: CPT | Performed by: REGISTERED NURSE

## 2023-10-02 PROCEDURE — 72100 X-RAY EXAM L-S SPINE 2/3 VWS: CPT | Performed by: RADIOLOGY

## 2023-10-02 PROCEDURE — 72100 X-RAY EXAM L-S SPINE 2/3 VWS: CPT | Mod: FY

## 2023-10-02 RX ORDER — CARIPRAZINE 6 MG/1
6 CAPSULE, GELATIN COATED ORAL DAILY
COMMUNITY
Start: 2023-09-12

## 2023-10-02 RX ORDER — CARIPRAZINE 4.5 MG/1
4.5 CAPSULE, GELATIN COATED ORAL DAILY
COMMUNITY
Start: 2023-06-29 | End: 2023-10-02 | Stop reason: WASHOUT

## 2023-10-02 RX ORDER — DEXTROAMPHETAMINE SULFATE, DEXTROAMPHETAMINE SACCHARATE, AMPHETAMINE SULFATE AND AMPHETAMINE ASPARTATE 5; 5; 5; 5 MG/1; MG/1; MG/1; MG/1
20 CAPSULE, EXTENDED RELEASE ORAL EVERY MORNING
COMMUNITY
Start: 2023-09-14

## 2023-10-02 RX ORDER — DEXTROAMPHETAMINE SACCHARATE, AMPHETAMINE ASPARTATE, DEXTROAMPHETAMINE SULFATE AND AMPHETAMINE SULFATE 2.5; 2.5; 2.5; 2.5 MG/1; MG/1; MG/1; MG/1
1 TABLET ORAL 2 TIMES DAILY
COMMUNITY
Start: 2023-08-31

## 2023-10-02 ASSESSMENT — ENCOUNTER SYMPTOMS
VOMITING: 0
COUGH: 0
ARTHRALGIAS: 1
RHINORRHEA: 0
CONFUSION: 0
FREQUENCY: 0
EYE REDNESS: 0
BACK PAIN: 1
DIARRHEA: 0
WOUND: 0
ABDOMINAL PAIN: 0
NAUSEA: 0
DIZZINESS: 0
FEVER: 0
CHILLS: 0
WHEEZING: 0
HEADACHES: 0
CONSTIPATION: 0
SHORTNESS OF BREATH: 0
NERVOUS/ANXIOUS: 0
DIFFICULTY URINATING: 0
WEAKNESS: 0
EYE DISCHARGE: 0
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
## 2023-10-09 DIAGNOSIS — G89.29 CHRONIC MIDLINE LOW BACK PAIN WITHOUT SCIATICA: Primary | ICD-10-CM

## 2023-10-09 DIAGNOSIS — M54.50 CHRONIC MIDLINE LOW BACK PAIN WITHOUT SCIATICA: Primary | ICD-10-CM

## 2023-10-09 NOTE — PROGRESS NOTES
Patient had Xrays done 10/2/23 for eval of her low back pain. She is trying supportive measures, heat, ice, stretching, Ibuprofen as needed. She is still having decreased ROM with rotation, flexion. Getting shooting pain down her buttocks into her knees. Pain with leg elevation. Would recommend MRI at this time for further evaluation.

## 2023-10-27 ENCOUNTER — APPOINTMENT (OUTPATIENT)
Dept: RADIOLOGY | Facility: HOSPITAL | Age: 43
End: 2023-10-27
Payer: COMMERCIAL

## 2023-12-05 RX ORDER — LURASIDONE HYDROCHLORIDE 20 MG/1
20 TABLET, FILM COATED ORAL DAILY
Qty: 30 TABLET | Refills: 0 | OUTPATIENT
Start: 2023-12-05

## 2024-01-08 DIAGNOSIS — K21.9 GASTROESOPHAGEAL REFLUX DISEASE WITHOUT ESOPHAGITIS: ICD-10-CM

## 2024-01-10 RX ORDER — FAMOTIDINE 20 MG/1
20 TABLET, FILM COATED ORAL
Qty: 30 TABLET | Refills: 5 | Status: SHIPPED | OUTPATIENT
Start: 2024-01-10

## 2024-02-26 ENCOUNTER — LAB (OUTPATIENT)
Dept: LAB | Facility: LAB | Age: 44
End: 2024-02-26
Payer: MEDICARE

## 2024-02-26 DIAGNOSIS — F43.10 POST-TRAUMATIC STRESS DISORDER, UNSPECIFIED: ICD-10-CM

## 2024-02-26 DIAGNOSIS — F31.9 BIPOLAR 1 DISORDER (MULTI): ICD-10-CM

## 2024-02-26 DIAGNOSIS — F41.1 GENERALIZED ANXIETY DISORDER: Primary | ICD-10-CM

## 2024-02-26 LAB
ALBUMIN SERPL BCP-MCNC: 4.1 G/DL (ref 3.4–5)
ALP SERPL-CCNC: 80 U/L (ref 33–110)
ALT SERPL W P-5'-P-CCNC: 30 U/L (ref 7–45)
AMPHETAMINES UR QL SCN: ABNORMAL
ANION GAP SERPL CALC-SCNC: 9 MMOL/L (ref 10–20)
AST SERPL W P-5'-P-CCNC: 22 U/L (ref 9–39)
BARBITURATES UR QL SCN: ABNORMAL
BASOPHILS # BLD AUTO: 0.06 X10*3/UL (ref 0–0.1)
BASOPHILS NFR BLD AUTO: 0.6 %
BENZODIAZ UR QL SCN: ABNORMAL
BILIRUB SERPL-MCNC: 0.3 MG/DL (ref 0–1.2)
BUN SERPL-MCNC: 12 MG/DL (ref 6–23)
BZE UR QL SCN: ABNORMAL
CALCIUM SERPL-MCNC: 9.3 MG/DL (ref 8.6–10.3)
CANNABINOIDS UR QL SCN: ABNORMAL
CHLORIDE SERPL-SCNC: 105 MMOL/L (ref 98–107)
CHOLEST SERPL-MCNC: 219 MG/DL (ref 0–199)
CHOLESTEROL/HDL RATIO: 4.3
CO2 SERPL-SCNC: 29 MMOL/L (ref 21–32)
CREAT SERPL-MCNC: 1.04 MG/DL (ref 0.5–1.05)
EGFRCR SERPLBLD CKD-EPI 2021: 69 ML/MIN/1.73M*2
EOSINOPHIL # BLD AUTO: 0.1 X10*3/UL (ref 0–0.7)
EOSINOPHIL NFR BLD AUTO: 1.1 %
ERYTHROCYTE [DISTWIDTH] IN BLOOD BY AUTOMATED COUNT: 12.8 % (ref 11.5–14.5)
FENTANYL+NORFENTANYL UR QL SCN: ABNORMAL
GLUCOSE SERPL-MCNC: 66 MG/DL (ref 74–99)
HCG UR QL IA.RAPID: NEGATIVE
HCT VFR BLD AUTO: 41.2 % (ref 36–46)
HDLC SERPL-MCNC: 50.4 MG/DL
HGB BLD-MCNC: 13.4 G/DL (ref 12–16)
IMM GRANULOCYTES # BLD AUTO: 0.04 X10*3/UL (ref 0–0.7)
IMM GRANULOCYTES NFR BLD AUTO: 0.4 % (ref 0–0.9)
LDLC SERPL CALC-MCNC: ABNORMAL MG/DL
LYMPHOCYTES # BLD AUTO: 2.6 X10*3/UL (ref 1.2–4.8)
LYMPHOCYTES NFR BLD AUTO: 27.3 %
MCH RBC QN AUTO: 29.2 PG (ref 26–34)
MCHC RBC AUTO-ENTMCNC: 32.5 G/DL (ref 32–36)
MCV RBC AUTO: 90 FL (ref 80–100)
MONOCYTES # BLD AUTO: 0.81 X10*3/UL (ref 0.1–1)
MONOCYTES NFR BLD AUTO: 8.5 %
NEUTROPHILS # BLD AUTO: 5.9 X10*3/UL (ref 1.2–7.7)
NEUTROPHILS NFR BLD AUTO: 62.1 %
NON HDL CHOLESTEROL: 169 MG/DL (ref 0–149)
NRBC BLD-RTO: 0 /100 WBCS (ref 0–0)
OPIATES UR QL SCN: ABNORMAL
OXYCODONE+OXYMORPHONE UR QL SCN: ABNORMAL
PCP UR QL SCN: ABNORMAL
PLATELET # BLD AUTO: 332 X10*3/UL (ref 150–450)
POTASSIUM SERPL-SCNC: 4.7 MMOL/L (ref 3.5–5.3)
PROT SERPL-MCNC: 6.1 G/DL (ref 6.4–8.2)
RBC # BLD AUTO: 4.59 X10*6/UL (ref 4–5.2)
SODIUM SERPL-SCNC: 138 MMOL/L (ref 136–145)
TRIGL SERPL-MCNC: 416 MG/DL (ref 0–149)
TSH SERPL-ACNC: 1.88 MIU/L (ref 0.44–3.98)
VLDL: ABNORMAL
WBC # BLD AUTO: 9.5 X10*3/UL (ref 4.4–11.3)

## 2024-02-26 PROCEDURE — 36415 COLL VENOUS BLD VENIPUNCTURE: CPT

## 2024-02-26 PROCEDURE — 80178 ASSAY OF LITHIUM: CPT

## 2024-02-26 PROCEDURE — 82607 VITAMIN B-12: CPT

## 2024-02-26 PROCEDURE — 84443 ASSAY THYROID STIM HORMONE: CPT

## 2024-02-26 PROCEDURE — 80349 CANNABINOIDS NATURAL: CPT

## 2024-02-26 PROCEDURE — 85025 COMPLETE CBC W/AUTO DIFF WBC: CPT

## 2024-02-26 PROCEDURE — 81025 URINE PREGNANCY TEST: CPT

## 2024-02-26 PROCEDURE — 80061 LIPID PANEL: CPT

## 2024-02-26 PROCEDURE — 80324 DRUG SCREEN AMPHETAMINES 1/2: CPT

## 2024-02-26 PROCEDURE — 83036 HEMOGLOBIN GLYCOSYLATED A1C: CPT

## 2024-02-26 PROCEDURE — 80307 DRUG TEST PRSMV CHEM ANLYZR: CPT

## 2024-02-26 PROCEDURE — 80053 COMPREHEN METABOLIC PANEL: CPT

## 2024-02-26 PROCEDURE — 82652 VIT D 1 25-DIHYDROXY: CPT

## 2024-02-27 LAB
EST. AVERAGE GLUCOSE BLD GHB EST-MCNC: 94 MG/DL
HBA1C MFR BLD: 4.9 %
LITHIUM SERPL-SCNC: <0.1 MMOL/L (ref 0.6–1.2)
VIT B12 SERPL-MCNC: 430 PG/ML (ref 211–911)

## 2024-02-28 LAB — 1,25(OH)2D3 SERPL-MCNC: 72.6 PG/ML (ref 19.9–79.3)

## 2024-02-29 LAB
AMPHET UR-MCNC: >5000 NG/ML
CARBOXYTHC UR-MCNC: 18 NG/ML
MDA UR-MCNC: <200 NG/ML
MDEA UR-MCNC: <200 NG/ML
MDMA UR-MCNC: <200 NG/ML
METHAMPHET UR-MCNC: <200 NG/ML
PHENTERMINE UR CFM-MCNC: <200 NG/ML

## 2024-03-07 ENCOUNTER — LAB (OUTPATIENT)
Dept: LAB | Facility: LAB | Age: 44
End: 2024-03-07
Payer: MEDICARE

## 2024-03-07 DIAGNOSIS — F31.9 BIPOLAR 1 DISORDER (MULTI): ICD-10-CM

## 2024-03-07 DIAGNOSIS — F43.10 POST-TRAUMATIC STRESS DISORDER, UNSPECIFIED: ICD-10-CM

## 2024-03-07 DIAGNOSIS — F41.1 GENERALIZED ANXIETY DISORDER: Primary | ICD-10-CM

## 2024-03-07 LAB
CHOLEST SERPL-MCNC: 198 MG/DL (ref 0–199)
CHOLESTEROL/HDL RATIO: 4
HDLC SERPL-MCNC: 50.1 MG/DL
LDLC SERPL CALC-MCNC: 113 MG/DL
NON HDL CHOLESTEROL: 148 MG/DL (ref 0–149)
TRIGL SERPL-MCNC: 174 MG/DL (ref 0–149)
TSH SERPL-ACNC: 2.19 MIU/L (ref 0.44–3.98)
VLDL: 35 MG/DL (ref 0–40)

## 2024-03-07 PROCEDURE — 84443 ASSAY THYROID STIM HORMONE: CPT

## 2024-03-07 PROCEDURE — 80178 ASSAY OF LITHIUM: CPT

## 2024-03-07 PROCEDURE — 36415 COLL VENOUS BLD VENIPUNCTURE: CPT

## 2024-03-07 PROCEDURE — 80061 LIPID PANEL: CPT

## 2024-03-08 LAB — LITHIUM SERPL-SCNC: 0.74 MMOL/L (ref 0.6–1.2)

## 2024-03-15 ENCOUNTER — LAB (OUTPATIENT)
Dept: LAB | Facility: LAB | Age: 44
End: 2024-03-15
Payer: MEDICARE

## 2024-03-15 DIAGNOSIS — F43.10 POST-TRAUMATIC STRESS DISORDER, UNSPECIFIED: ICD-10-CM

## 2024-03-15 DIAGNOSIS — F41.1 GENERALIZED ANXIETY DISORDER: Primary | ICD-10-CM

## 2024-03-15 DIAGNOSIS — F31.9 BIPOLAR 1 DISORDER (MULTI): ICD-10-CM

## 2024-03-15 LAB
ALBUMIN SERPL BCP-MCNC: 4.4 G/DL (ref 3.4–5)
ALP SERPL-CCNC: 91 U/L (ref 33–110)
ALT SERPL W P-5'-P-CCNC: 36 U/L (ref 7–45)
ANION GAP SERPL CALC-SCNC: 10 MMOL/L (ref 10–20)
AST SERPL W P-5'-P-CCNC: 29 U/L (ref 9–39)
BILIRUB SERPL-MCNC: 0.3 MG/DL (ref 0–1.2)
BUN SERPL-MCNC: 14 MG/DL (ref 6–23)
CALCIUM SERPL-MCNC: 9.5 MG/DL (ref 8.6–10.3)
CHLORIDE SERPL-SCNC: 99 MMOL/L (ref 98–107)
CO2 SERPL-SCNC: 29 MMOL/L (ref 21–32)
CREAT SERPL-MCNC: 1.38 MG/DL (ref 0.5–1.05)
EGFRCR SERPLBLD CKD-EPI 2021: 49 ML/MIN/1.73M*2
GLUCOSE SERPL-MCNC: 94 MG/DL (ref 74–99)
POTASSIUM SERPL-SCNC: 4.2 MMOL/L (ref 3.5–5.3)
PROT SERPL-MCNC: 6.6 G/DL (ref 6.4–8.2)
SODIUM SERPL-SCNC: 134 MMOL/L (ref 136–145)
TSH SERPL-ACNC: 1.53 MIU/L (ref 0.44–3.98)

## 2024-03-15 PROCEDURE — 80053 COMPREHEN METABOLIC PANEL: CPT

## 2024-03-15 PROCEDURE — 36415 COLL VENOUS BLD VENIPUNCTURE: CPT

## 2024-03-15 PROCEDURE — 84443 ASSAY THYROID STIM HORMONE: CPT

## 2024-03-15 PROCEDURE — 80178 ASSAY OF LITHIUM: CPT

## 2024-03-16 LAB — LITHIUM SERPL-SCNC: 0.43 MMOL/L (ref 0.6–1.2)

## 2024-04-15 ENCOUNTER — APPOINTMENT (OUTPATIENT)
Dept: RADIOLOGY | Facility: HOSPITAL | Age: 44
End: 2024-04-15
Payer: COMMERCIAL

## 2024-04-16 ENCOUNTER — APPOINTMENT (OUTPATIENT)
Dept: RADIOLOGY | Facility: HOSPITAL | Age: 44
End: 2024-04-16
Payer: MEDICARE

## 2024-04-16 ENCOUNTER — HOSPITAL ENCOUNTER (OUTPATIENT)
Dept: RADIOLOGY | Facility: HOSPITAL | Age: 44
Discharge: HOME | End: 2024-04-16
Payer: MEDICARE

## 2024-04-16 DIAGNOSIS — Z12.31 ENCOUNTER FOR SCREENING MAMMOGRAM FOR MALIGNANT NEOPLASM OF BREAST: ICD-10-CM

## 2024-04-18 ENCOUNTER — HOSPITAL ENCOUNTER (OUTPATIENT)
Dept: RADIOLOGY | Facility: HOSPITAL | Age: 44
Discharge: HOME | End: 2024-04-18
Payer: MEDICARE

## 2024-04-18 DIAGNOSIS — N93.9 ABNORMAL UTERINE BLEEDING: ICD-10-CM

## 2024-04-18 PROCEDURE — 76856 US EXAM PELVIC COMPLETE: CPT

## 2024-04-18 PROCEDURE — 76856 US EXAM PELVIC COMPLETE: CPT | Performed by: STUDENT IN AN ORGANIZED HEALTH CARE EDUCATION/TRAINING PROGRAM

## 2024-04-18 PROCEDURE — 76830 TRANSVAGINAL US NON-OB: CPT | Performed by: STUDENT IN AN ORGANIZED HEALTH CARE EDUCATION/TRAINING PROGRAM

## 2024-04-22 ENCOUNTER — HOSPITAL ENCOUNTER (OUTPATIENT)
Dept: RADIOLOGY | Facility: HOSPITAL | Age: 44
Discharge: HOME | End: 2024-04-22
Payer: MEDICARE

## 2024-04-22 VITALS — BODY MASS INDEX: 34.07 KG/M2 | WEIGHT: 230 LBS | HEIGHT: 69 IN

## 2024-04-22 PROCEDURE — 77067 SCR MAMMO BI INCL CAD: CPT

## 2024-04-22 PROCEDURE — 77063 BREAST TOMOSYNTHESIS BI: CPT | Performed by: RADIOLOGY

## 2024-04-22 PROCEDURE — 77067 SCR MAMMO BI INCL CAD: CPT | Performed by: RADIOLOGY

## 2024-06-21 ENCOUNTER — LAB (OUTPATIENT)
Dept: LAB | Facility: LAB | Age: 44
End: 2024-06-21
Payer: COMMERCIAL

## 2024-06-21 DIAGNOSIS — F31.81 BIPOLAR II DISORDER (MULTI): ICD-10-CM

## 2024-06-21 DIAGNOSIS — F90.2 ATTENTION-DEFICIT HYPERACTIVITY DISORDER, COMBINED TYPE: ICD-10-CM

## 2024-06-21 DIAGNOSIS — F43.10 POST-TRAUMATIC STRESS DISORDER, UNSPECIFIED: ICD-10-CM

## 2024-06-21 DIAGNOSIS — F41.1 GENERALIZED ANXIETY DISORDER: Primary | ICD-10-CM

## 2024-06-21 DIAGNOSIS — F31.4 BIPOLAR DISORDER, CURRENT EPISODE DEPRESSED, SEVERE, WITHOUT PSYCHOTIC FEATURES (MULTI): ICD-10-CM

## 2024-06-21 LAB
BASOPHILS # BLD AUTO: 0.08 X10*3/UL (ref 0–0.1)
BASOPHILS NFR BLD AUTO: 0.9 %
EOSINOPHIL # BLD AUTO: 0.07 X10*3/UL (ref 0–0.7)
EOSINOPHIL NFR BLD AUTO: 0.8 %
ERYTHROCYTE [DISTWIDTH] IN BLOOD BY AUTOMATED COUNT: 13.1 % (ref 11.5–14.5)
HCT VFR BLD AUTO: 41.9 % (ref 36–46)
HGB BLD-MCNC: 13.6 G/DL (ref 12–16)
IMM GRANULOCYTES # BLD AUTO: 0.03 X10*3/UL (ref 0–0.7)
IMM GRANULOCYTES NFR BLD AUTO: 0.3 % (ref 0–0.9)
LYMPHOCYTES # BLD AUTO: 2.22 X10*3/UL (ref 1.2–4.8)
LYMPHOCYTES NFR BLD AUTO: 25.3 %
MCH RBC QN AUTO: 27.9 PG (ref 26–34)
MCHC RBC AUTO-ENTMCNC: 32.5 G/DL (ref 32–36)
MCV RBC AUTO: 86 FL (ref 80–100)
MONOCYTES # BLD AUTO: 0.57 X10*3/UL (ref 0.1–1)
MONOCYTES NFR BLD AUTO: 6.5 %
NEUTROPHILS # BLD AUTO: 5.82 X10*3/UL (ref 1.2–7.7)
NEUTROPHILS NFR BLD AUTO: 66.2 %
NRBC BLD-RTO: 0 /100 WBCS (ref 0–0)
PLATELET # BLD AUTO: 379 X10*3/UL (ref 150–450)
RBC # BLD AUTO: 4.88 X10*6/UL (ref 4–5.2)
WBC # BLD AUTO: 8.8 X10*3/UL (ref 4.4–11.3)

## 2024-06-21 PROCEDURE — 82627 DEHYDROEPIANDROSTERONE: CPT

## 2024-06-21 PROCEDURE — 84270 ASSAY OF SEX HORMONE GLOBUL: CPT

## 2024-06-21 PROCEDURE — 82390 ASSAY OF CERULOPLASMIN: CPT

## 2024-06-21 PROCEDURE — 84207 ASSAY OF VITAMIN B-6: CPT

## 2024-06-21 PROCEDURE — 84630 ASSAY OF ZINC: CPT

## 2024-06-21 PROCEDURE — 84402 ASSAY OF FREE TESTOSTERONE: CPT

## 2024-06-21 PROCEDURE — 82670 ASSAY OF TOTAL ESTRADIOL: CPT

## 2024-06-21 PROCEDURE — 82607 VITAMIN B-12: CPT

## 2024-06-21 PROCEDURE — 82533 TOTAL CORTISOL: CPT

## 2024-06-21 PROCEDURE — 82525 ASSAY OF COPPER: CPT

## 2024-06-21 PROCEDURE — 36415 COLL VENOUS BLD VENIPUNCTURE: CPT

## 2024-06-21 PROCEDURE — 82306 VITAMIN D 25 HYDROXY: CPT

## 2024-06-21 PROCEDURE — 83002 ASSAY OF GONADOTROPIN (LH): CPT

## 2024-06-21 PROCEDURE — 83001 ASSAY OF GONADOTROPIN (FSH): CPT

## 2024-06-21 PROCEDURE — 84443 ASSAY THYROID STIM HORMONE: CPT

## 2024-06-21 PROCEDURE — 85025 COMPLETE CBC W/AUTO DIFF WBC: CPT

## 2024-06-22 ENCOUNTER — APPOINTMENT (OUTPATIENT)
Dept: LAB | Facility: LAB | Age: 44
End: 2024-06-22
Payer: COMMERCIAL

## 2024-06-22 DIAGNOSIS — F41.1 GENERALIZED ANXIETY DISORDER: Primary | ICD-10-CM

## 2024-06-22 DIAGNOSIS — F31.81 BIPOLAR II DISORDER (MULTI): ICD-10-CM

## 2024-06-22 DIAGNOSIS — F31.4 BIPOLAR DISORDER, CURRENT EPISODE DEPRESSED, SEVERE, WITHOUT PSYCHOTIC FEATURES (MULTI): ICD-10-CM

## 2024-06-22 DIAGNOSIS — F43.10 POST-TRAUMATIC STRESS DISORDER, UNSPECIFIED: ICD-10-CM

## 2024-06-22 DIAGNOSIS — F90.2 ATTENTION-DEFICIT HYPERACTIVITY DISORDER, COMBINED TYPE: ICD-10-CM

## 2024-06-22 LAB
25(OH)D3 SERPL-MCNC: 78 NG/ML (ref 30–100)
ALBUMIN SERPL BCP-MCNC: 4.6 G/DL (ref 3.4–5)
ALP SERPL-CCNC: 70 U/L (ref 33–110)
ALT SERPL W P-5'-P-CCNC: 28 U/L (ref 7–45)
ANION GAP SERPL CALC-SCNC: 17 MMOL/L (ref 10–20)
AST SERPL W P-5'-P-CCNC: 20 U/L (ref 9–39)
BILIRUB SERPL-MCNC: 0.5 MG/DL (ref 0–1.2)
BUN SERPL-MCNC: 10 MG/DL (ref 6–23)
CALCIUM SERPL-MCNC: 10.1 MG/DL (ref 8.6–10.3)
CERULOPLASMIN SERPL-MCNC: 32.2 MG/DL (ref 20–60)
CHLORIDE SERPL-SCNC: 103 MMOL/L (ref 98–107)
CHOLEST SERPL-MCNC: 150 MG/DL (ref 0–199)
CHOLESTEROL/HDL RATIO: 3.7
CO2 SERPL-SCNC: 24 MMOL/L (ref 21–32)
CORTIS AM PEAK SERPL-MSCNC: 12.5 UG/DL (ref 5–20)
CREAT SERPL-MCNC: 1.32 MG/DL (ref 0.5–1.05)
DHEA-S SERPL-MCNC: 269 UG/DL (ref 12–379)
EGFRCR SERPLBLD CKD-EPI 2021: 51 ML/MIN/1.73M*2
ESTRADIOL SERPL-MCNC: 70 PG/ML
FSH SERPL-ACNC: 13.3 IU/L
GLUCOSE SERPL-MCNC: 89 MG/DL (ref 74–99)
HDLC SERPL-MCNC: 40.6 MG/DL
LDLC SERPL CALC-MCNC: 86 MG/DL
LH SERPL-ACNC: 5.8 IU/L
MAGNESIUM SERPL-MCNC: 1.98 MG/DL (ref 1.6–2.4)
NON HDL CHOLESTEROL: 109 MG/DL (ref 0–149)
POTASSIUM SERPL-SCNC: 4.6 MMOL/L (ref 3.5–5.3)
PROT SERPL-MCNC: 7 G/DL (ref 6.4–8.2)
SODIUM SERPL-SCNC: 139 MMOL/L (ref 136–145)
TRIGL SERPL-MCNC: 119 MG/DL (ref 0–149)
TSH SERPL-ACNC: 1.44 MIU/L (ref 0.44–3.98)
VIT B12 SERPL-MCNC: 813 PG/ML (ref 211–911)
VLDL: 24 MG/DL (ref 0–40)

## 2024-06-23 LAB — SHBG SERPL-SCNC: 46 NMOL/L (ref 25–122)

## 2024-06-24 LAB
COPPER SERPL-MCNC: 118.8 UG/DL (ref 80–155)
ZINC SERPL-MCNC: 64.6 UG/DL (ref 60–120)

## 2024-06-25 ENCOUNTER — LAB (OUTPATIENT)
Dept: LAB | Facility: LAB | Age: 44
End: 2024-06-25
Payer: COMMERCIAL

## 2024-06-25 LAB — PYRIDOXAL PHOS SERPL-SCNC: 45 NMOL/L (ref 20–125)

## 2024-06-27 LAB
TESTOSTERONE FREE (CHAN): 3.1 PG/ML (ref 0.1–6.4)
TESTOSTERONE,TOTAL,LC-MS/MS: 26 NG/DL (ref 2–45)

## 2024-06-28 ENCOUNTER — LAB (OUTPATIENT)
Dept: LAB | Facility: LAB | Age: 44
End: 2024-06-28
Payer: COMMERCIAL

## 2024-06-28 DIAGNOSIS — F41.1 GENERALIZED ANXIETY DISORDER: ICD-10-CM

## 2024-06-28 DIAGNOSIS — F90.2 ATTENTION-DEFICIT HYPERACTIVITY DISORDER, COMBINED TYPE: ICD-10-CM

## 2024-06-28 DIAGNOSIS — F31.81 BIPOLAR II DISORDER (MULTI): ICD-10-CM

## 2024-06-28 DIAGNOSIS — F31.4 BIPOLAR DISORDER, CURRENT EPISODE DEPRESSED, SEVERE, WITHOUT PSYCHOTIC FEATURES (MULTI): ICD-10-CM

## 2024-06-28 DIAGNOSIS — F43.10 POST-TRAUMATIC STRESS DISORDER, UNSPECIFIED: ICD-10-CM

## 2024-07-26 NOTE — PROGRESS NOTES
Subjective   Patient ID: Chery Matias is a 44 y.o. female who presents for Diarrhea (Has been an issue with this for a while; she thought it was due to RedBull which she stopped drinking with no change; yesterday she went to the bathroom around 10 times in a 3 hr period; denies pain besides discomfort while wiping; denies seeing blood;  ).    HPI     Diarrhea: Started a couple of months ago. Yesterday she was in 10 times within 3 hour periods. Liquid. She will go to urinate but she will also have some diarrhea come out at the same time. No abdominal pain, pain with wiping. No blood in her stool. Has been taking pepto, imodium and this has not helped. No recent travel.     She was 230lbs in April and now 205lbs.     She had a full hysterectomy, she had stool softeners and did not take them r/t diarrhea.     Lithium was helping.     She quit redbull thinking this was a trigger.     Has had issues since 2022. Did stool tests and negative.     She used to work at [employer] for 8 years.       She is seeing psych, she has tried multiple medications and even ketamine and not noticing anything.       All other systems reviewed and negative for complaint unless stated above.      Review of Systems    Objective   There were no vitals taken for this visit.    Physical Exam  Constitutional:       Appearance: Normal appearance.   Cardiovascular:      Rate and Rhythm: Normal rate and regular rhythm.   Pulmonary:      Effort: Pulmonary effort is normal.      Breath sounds: Normal breath sounds.   Abdominal:      General: Abdomen is flat.      Palpations: Abdomen is soft. There is no mass.      Tenderness: There is no abdominal tenderness.   Skin:     General: Skin is warm and dry.   Neurological:      Mental Status: She is alert and oriented to person, place, and time. Mental status is at baseline.   Psychiatric:         Mood and Affect: Mood normal.         Behavior: Behavior normal.         Assessment/Plan   Diagnoses and  all orders for this visit:  Diarrhea, unspecified type  -     Comprehensive Metabolic Panel; Future  -     Heavy Metals, Blood; Future  -     Referral to Gastroenterology; Future  -     diphenoxylate-atropine (Lomotil) 2.5-0.025 mg tablet; Take 1 tablet by mouth 4 times a day as needed for diarrhea for up to 14 days.

## 2024-07-31 ENCOUNTER — APPOINTMENT (OUTPATIENT)
Dept: PRIMARY CARE | Facility: CLINIC | Age: 44
End: 2024-07-31
Payer: MEDICARE

## 2024-07-31 VITALS
HEART RATE: 98 BPM | DIASTOLIC BLOOD PRESSURE: 84 MMHG | WEIGHT: 205.4 LBS | SYSTOLIC BLOOD PRESSURE: 123 MMHG | BODY MASS INDEX: 30.33 KG/M2

## 2024-07-31 DIAGNOSIS — R19.7 DIARRHEA, UNSPECIFIED TYPE: Primary | ICD-10-CM

## 2024-07-31 PROCEDURE — 3079F DIAST BP 80-89 MM HG: CPT | Performed by: REGISTERED NURSE

## 2024-07-31 PROCEDURE — 99214 OFFICE O/P EST MOD 30 MIN: CPT | Performed by: REGISTERED NURSE

## 2024-07-31 PROCEDURE — 3074F SYST BP LT 130 MM HG: CPT | Performed by: REGISTERED NURSE

## 2024-07-31 RX ORDER — DIPHENOXYLATE HYDROCHLORIDE AND ATROPINE SULFATE 2.5; .025 MG/1; MG/1
1 TABLET ORAL 4 TIMES DAILY PRN
Qty: 56 TABLET | Refills: 0 | Status: SHIPPED | OUTPATIENT
Start: 2024-07-31 | End: 2024-08-14

## 2024-07-31 RX ORDER — ENOXAPARIN SODIUM 100 MG/ML
INJECTION SUBCUTANEOUS
COMMUNITY
Start: 2024-07-24

## 2024-07-31 RX ORDER — METHYLPHENIDATE HYDROCHLORIDE 40 MG/1
CAPSULE, EXTENDED RELEASE ORAL
COMMUNITY
Start: 2024-07-29

## 2024-07-31 RX ORDER — ONDANSETRON 4 MG/1
TABLET, ORALLY DISINTEGRATING ORAL
COMMUNITY
Start: 2024-07-10

## 2024-07-31 RX ORDER — IBUPROFEN 600 MG/1
1 TABLET ORAL EVERY 6 HOURS
COMMUNITY
Start: 2024-07-13

## 2024-07-31 RX ORDER — DESVENLAFAXINE 100 MG/1
TABLET, EXTENDED RELEASE ORAL
COMMUNITY
Start: 2024-07-29

## 2024-07-31 RX ORDER — OXYCODONE HYDROCHLORIDE 10 MG/1
TABLET ORAL
COMMUNITY
Start: 2024-07-13

## 2024-07-31 RX ORDER — ESTRADIOL 0.1 MG/D
FILM, EXTENDED RELEASE TRANSDERMAL
COMMUNITY
Start: 2024-07-19

## 2024-07-31 RX ORDER — DIAZEPAM 5 MG/1
1 TABLET ORAL
COMMUNITY
Start: 2024-07-13

## 2024-07-31 NOTE — PATIENT INSTRUCTIONS
Gastroenterology:  Aleyxs Serrato ([facility]) [phone]  Alexys Conte ([facility]) [phone]  Kem Corona ([facility]) [phone]  Nighat Anne () [phone]

## 2024-08-10 ENCOUNTER — HOSPITAL ENCOUNTER (EMERGENCY)
Facility: HOSPITAL | Age: 44
Discharge: LEFT BEFORE DISCHARGE | End: 2024-08-10
Attending: FAMILY MEDICINE
Payer: COMMERCIAL

## 2024-08-10 VITALS
HEIGHT: 66 IN | BODY MASS INDEX: 32.95 KG/M2 | RESPIRATION RATE: 19 BRPM | DIASTOLIC BLOOD PRESSURE: 100 MMHG | OXYGEN SATURATION: 99 % | SYSTOLIC BLOOD PRESSURE: 156 MMHG | HEART RATE: 102 BPM | WEIGHT: 205 LBS | TEMPERATURE: 97 F

## 2024-08-10 DIAGNOSIS — R45.89 TEARFULNESS: Primary | ICD-10-CM

## 2024-08-10 DIAGNOSIS — Z53.29 LEFT AGAINST MEDICAL ADVICE: ICD-10-CM

## 2024-08-10 DIAGNOSIS — R45.89 ANXIOUS APPEARANCE: ICD-10-CM

## 2024-08-10 PROCEDURE — 99281 EMR DPT VST MAYX REQ PHY/QHP: CPT

## 2024-08-10 ASSESSMENT — COLUMBIA-SUICIDE SEVERITY RATING SCALE - C-SSRS
2. HAVE YOU ACTUALLY HAD ANY THOUGHTS OF KILLING YOURSELF?: NO
6. HAVE YOU EVER DONE ANYTHING, STARTED TO DO ANYTHING, OR PREPARED TO DO ANYTHING TO END YOUR LIFE?: NO
1. IN THE PAST MONTH, HAVE YOU WISHED YOU WERE DEAD OR WISHED YOU COULD GO TO SLEEP AND NOT WAKE UP?: NO

## 2024-08-10 ASSESSMENT — PAIN SCALES - GENERAL: PAINLEVEL_OUTOF10: 0 - NO PAIN

## 2024-08-10 ASSESSMENT — PAIN - FUNCTIONAL ASSESSMENT: PAIN_FUNCTIONAL_ASSESSMENT: 0-10

## 2024-08-11 NOTE — ED PROVIDER NOTES
HPI   Chief Complaint   Patient presents with    Anxiety       44-year-old female with history of anxiety, antiphospholipid syndrome, GERD, hypertension, and depression comes the ED appearing to be very anxious and tearful complaining of hormonal imbalances causing her to be emotionally unstable as well as feeling side effects.  Patient enters the ED crying while stating that her doctor tried change her medications to help her with her anxiety and her side effects from her hysterectomy but feels they are not helping her.  Patient continues to ramble on hysterically about these complaints with family at bedside.  Family has reported that patient been dealing with these changes since the procedure and taking her medications and was feeling overwhelmed today and not comfortable because a side effect continue to worsen.  She wanted be brought to the ED to get evaluated.  No further history can be obtained at this time from the patient as she continues to be redirected to calm down discussed her concerns further.      History provided by:  Patient, relative and medical records   used: No            Patient History   Past Medical History:   Diagnosis Date    Encounter for screening for malignant neoplasm of cervix 2013    Screening for malignant neoplasm of cervix    Maternal care for other abnormalities of cervix, unspecified trimester ([facility] 2016    H/O LEEP (loop electrosurgical excision procedure) of cervix complicating pregnancy    Multiple births, unspecified, all liveborn ([facility])     Multiple births, all liveborn    Other conditions influencing health status     Menstruation    Personal history of diseases of the blood and blood-forming organs and certain disorders involving the immune mechanism     History of antiphospholipid syndrome    Personal history of other complications of pregnancy, childbirth and the puerperium     History of spontaneous     Personal history of  other diseases of the circulatory system     History of high blood pressure    Personal history of other diseases of the female genital tract     Vaginal delivery    Personal history of other infectious and parasitic diseases 04/12/2019    History of varicella    Personal history of other specified conditions     History of abnormal Pap smear    Severe pre-eclampsia, unspecified trimester ([facility])     Severely increased blood pressure and swelling during pregnancy     Past Surgical History:   Procedure Laterality Date    CERVICAL BIOPSY  W/ LOOP ELECTRODE EXCISION  03/31/2014    Cervical Loop Electrosurgical Excision (LEEP)    DILATION AND CURETTAGE OF UTERUS  03/31/2014    Dilation And Curettage    HYSTERECTOMY      TONSILLECTOMY  04/19/2018    Tonsillectomy     Family History   Problem Relation Name Age of Onset    Adjustment Disorder with Mixed Anxiety and Depressed Mood Mother      Other (cardiac disorder) Mother      Depression Mother      Hypertension Mother      Stroke Mother      Hyperlipidemia Mother      Arthritis Maternal Grandmother      Other (cardiac disorder) Maternal Grandfather      Depression Maternal Grandfather      Adjustment Disorder with Mixed Anxiety and Depressed Mood Other grandfather     Other (cardiac disorder) Other grandfather     Hypertension Other grandfather     Stroke Other grandfather     Hyperlipidemia Other grandfather      Social History     Tobacco Use    Smoking status: Former     Types: Cigarettes    Smokeless tobacco: Never   Vaping Use    Vaping status: Every Day   Substance Use Topics    Alcohol use: Not on file    Drug use: Never       Physical Exam   ED Triage Vitals [08/10/24 2026]   Temperature Heart Rate Respirations BP   36.1 °C (97 °F) (!) 102 19 (!) 156/100      SpO2 Temp Source Heart Rate Source Patient Position   99 % Tympanic -- --      BP Location FiO2 (%)     -- --       Physical Exam    Patient did not allow exam      ED Course & MDM   Diagnoses as of  08/10/24 2234   Tearfulness   Anxious appearance   Left against medical advice                 No data recorded     Bernadette Coma Scale Score: 15 (08/10/24 2027 : Marce Gonzalez RN)                           Medical Decision Making  Upon patient's arrival she was hysterical requiring redirection to allow for clear understanding of her complaint.  Patient continued to be upset about her symptoms and change in her medications by her doctor to try to control them.  During this time patient was being offered options to discuss her concerns and go over medications but continue to be very anxious and upset and no longer wanted to talk about her issues.  Patient reports that she wants to leave immediately right now and wants to go deal with the problem with her doctor.  Continued conversation was had with the patient however she continued to not answer questions and got upset and stormed out leave AGAINST MEDICAL ADVICE without waiting to discuss concerns about limited evaluation and understanding of her complaint.  Patient did not wait around to further discuss and walked out of the ED AGAINST MEDICAL ADVICE.  The pt has informed us that they wish to leave against medical advice.  The patient would not give a reason and stormed out.  the patient  has been informed of the risks of refusing further evaluation and treatment as necessary including death, disability,loss of current lifestyle, inability to earn a living,  and any other condition that cannot be determined at this time . They have expressed an understanding of these risks and they still wish to leave against medical advice. They have been informed that they can return to the emergency department at any time for further treatment or testing. They have been provided with appropriate warnings, followup instructions, and whatever treatment can be determined to be appropriate given their desire to leave against medical advice.  All clinical information was  reviewed/discussed with patient/family. The patient has decision-making capacity. There are no criteria for involuntary commitment. patient aware of possibilities of medical problems based on initial evaluation. patient is capable of making decisions regarding medical care being offered.             Procedure  Procedures     Mckinley Frazier MD  08/10/24 4595

## 2024-08-11 NOTE — ED TRIAGE NOTES
Pt arrived very tearful with mother. States tired of being upset and anxious. Denies SI. States safe at home. States psch dr states a hysterectomy may help per pt. So pt reprots did have a hyster with no relief. Mother at bedside

## 2024-09-04 ENCOUNTER — LAB (OUTPATIENT)
Dept: LAB | Facility: LAB | Age: 44
End: 2024-09-04
Payer: COMMERCIAL

## 2024-09-04 DIAGNOSIS — F90.2 ATTENTION-DEFICIT HYPERACTIVITY DISORDER, COMBINED TYPE: Primary | ICD-10-CM

## 2024-09-04 DIAGNOSIS — R19.7 DIARRHEA, UNSPECIFIED TYPE: ICD-10-CM

## 2024-09-04 DIAGNOSIS — F43.10 POST-TRAUMATIC STRESS DISORDER, UNSPECIFIED: ICD-10-CM

## 2024-09-04 LAB
ALBUMIN SERPL BCP-MCNC: 3.8 G/DL (ref 3.4–5)
ALP SERPL-CCNC: 65 U/L (ref 33–110)
ALT SERPL W P-5'-P-CCNC: 16 U/L (ref 7–45)
ANION GAP SERPL CALC-SCNC: 8 MMOL/L (ref 10–20)
AST SERPL W P-5'-P-CCNC: 13 U/L (ref 9–39)
BILIRUB SERPL-MCNC: 0.3 MG/DL (ref 0–1.2)
BUN SERPL-MCNC: 12 MG/DL (ref 6–23)
CALCIUM SERPL-MCNC: 9 MG/DL (ref 8.6–10.3)
CHLORIDE SERPL-SCNC: 108 MMOL/L (ref 98–107)
CO2 SERPL-SCNC: 29 MMOL/L (ref 21–32)
CREAT SERPL-MCNC: 1.16 MG/DL (ref 0.5–1.05)
EGFRCR SERPLBLD CKD-EPI 2021: 60 ML/MIN/1.73M*2
GLUCOSE SERPL-MCNC: 98 MG/DL (ref 74–99)
POTASSIUM SERPL-SCNC: 4.6 MMOL/L (ref 3.5–5.3)
PROT SERPL-MCNC: 5.8 G/DL (ref 6.4–8.2)
SODIUM SERPL-SCNC: 140 MMOL/L (ref 136–145)

## 2024-09-04 PROCEDURE — 83825 ASSAY OF MERCURY: CPT

## 2024-09-04 PROCEDURE — 80053 COMPREHEN METABOLIC PANEL: CPT

## 2024-09-04 PROCEDURE — 80178 ASSAY OF LITHIUM: CPT

## 2024-09-04 PROCEDURE — 36415 COLL VENOUS BLD VENIPUNCTURE: CPT

## 2024-09-05 LAB — LITHIUM SERPL-SCNC: <0.1 MMOL/L (ref 0.6–1.2)

## 2024-09-06 LAB
ARSENIC BLD-MCNC: <10 UG/L
LEAD BLDV-MCNC: <2 UG/DL
MERCURY BLD-MCNC: <2.5 UG/L

## 2024-09-09 DIAGNOSIS — N28.9 FUNCTION KIDNEY DECREASED: Primary | ICD-10-CM

## 2024-09-10 DIAGNOSIS — K21.9 GASTROESOPHAGEAL REFLUX DISEASE WITHOUT ESOPHAGITIS: ICD-10-CM

## 2024-09-10 RX ORDER — FAMOTIDINE 20 MG/1
20 TABLET, FILM COATED ORAL
Qty: 90 TABLET | Refills: 1 | Status: SHIPPED | OUTPATIENT
Start: 2024-09-10

## 2024-09-17 DIAGNOSIS — K59.00 CONSTIPATION, UNSPECIFIED CONSTIPATION TYPE: Primary | ICD-10-CM

## 2024-09-17 RX ORDER — DOCUSATE SODIUM 100 MG/1
100 CAPSULE, LIQUID FILLED ORAL 2 TIMES DAILY
Qty: 60 CAPSULE | Refills: 0 | Status: SHIPPED | OUTPATIENT
Start: 2024-09-17 | End: 2024-10-17

## 2024-09-23 DIAGNOSIS — K21.9 GASTROESOPHAGEAL REFLUX DISEASE WITHOUT ESOPHAGITIS: ICD-10-CM

## 2024-09-23 RX ORDER — FAMOTIDINE 20 MG/1
20 TABLET, FILM COATED ORAL DAILY
Qty: 90 TABLET | Refills: 1 | Status: SHIPPED | OUTPATIENT
Start: 2024-09-23

## 2024-11-06 NOTE — PROGRESS NOTES
Subjective   Patient ID: Chery Matias is a 44 y.o. female who presents for No chief complaint on file..  HPI    Current Outpatient Medications:     Adderall XR 20 mg 24 hr capsule, Take 1 capsule (20 mg) by mouth once daily in the morning., Disp: , Rfl:     amphetamine-dextroamphetamine (Adderall) 10 mg tablet, Take 1 tablet (10 mg) by mouth 2 times a day., Disp: , Rfl:     buPROPion XL (Wellbutrin XL) 300 mg 24 hr tablet, Take 1 tablet (300 mg) by mouth once daily in the morning. Take before meals., Disp: , Rfl:     clonazePAM (KlonoPIN) 1 mg tablet, Take 1 tablet (1 mg) by mouth 2 times a day., Disp: , Rfl:     desvenlafaxine 100 mg 24 hr tablet, , Disp: , Rfl:     diazePAM (Valium) 5 mg tablet, Take 1 tablet (5 mg) by mouth 3 times a day., Disp: , Rfl:     enoxaparin (Lovenox) 40 mg/0.4 mL syringe, , Disp: , Rfl:     estradiol (Vivelle-DOT) 0.1 mg/24 hr patch, APPLY 1 PATCH TO SKIN TWICE WEEKLY FOR 28 DAYS AS DIRECTED, Disp: , Rfl:     famotidine (Pepcid) 20 mg tablet, Take 1 tablet (20 mg) by mouth once daily., Disp: 90 tablet, Rfl: 1    FLUoxetine (PROzac) 40 mg capsule, Take 1 capsule (40 mg) by mouth once daily., Disp: , Rfl:     ibuprofen 600 mg tablet, Take 1 tablet (600 mg) by mouth every 6 hours., Disp: , Rfl:     lumateperone (Caplyta) 42 mg capsule, Take 1 capsule (42 mg) by mouth once daily., Disp: , Rfl:     methylphenidate CD (Metadate CD) 40 mg daily capsule, , Disp: , Rfl:     ondansetron ODT (Zofran-ODT) 4 mg disintegrating tablet, DISSOLVE 1 TABLET ON THE TONGUE EVERY 6-8 HOURS AS DIRECTED, Disp: , Rfl:     OXcarbazepine (Trileptal) 300 mg tablet, Take 1 tablet (300 mg) by mouth 2 times a day., Disp: , Rfl:     oxyCODONE (Roxicodone) 10 mg immediate release tablet, TAKE 2 TABLETS BY MOUTH EVERY 4 HOURS FOR ONGOING MANAGEMENT, Disp: , Rfl:     Vraylar 6 mg capsule, Take 1 capsule (6 mg) by mouth once daily., Disp: , Rfl:     Review of Systems    LMP 04/15/2024      Objective         Problem List  Items Addressed This Visit    None    Physical Exam    Gen: No acute distress, alert and oriented x3, pleasant   HEENT: moist mucous membranes, b/l external auditory canals are clear of debris, TMs within normal limits, no oropharyngeal lesions, eomi, perrla   Neck: thyroid within normal limits, no lymphadenopathy   CV: RRR, normal S1/S2, no murmur   Resp: Clear to auscultation bilaterally, no wheezes or rhonchi appreciated  Abd: soft, nontender, non-distended, no guarding/rigidity, bowel sounds present  Extr: no edema, no calf tenderness  Derm: Skin is warm and dry, no rashes appreciated  Psych: mood is good, affect is congruent, good hygiene, normal speech and eye contact  Neuro: cranial nerves grossly intact, normal gait      Assessment/Plan   {Assess/PlanSmartLinks:[id number]}

## 2024-11-07 ENCOUNTER — APPOINTMENT (OUTPATIENT)
Dept: PRIMARY CARE | Facility: CLINIC | Age: 44
End: 2024-11-07
Payer: COMMERCIAL

## 2024-11-08 ENCOUNTER — APPOINTMENT (OUTPATIENT)
Dept: GASTROENTEROLOGY | Facility: CLINIC | Age: 44
End: 2024-11-08
Payer: COMMERCIAL

## 2024-11-12 ENCOUNTER — APPOINTMENT (OUTPATIENT)
Dept: PRIMARY CARE | Facility: CLINIC | Age: 44
End: 2024-11-12
Payer: COMMERCIAL

## 2025-02-23 ENCOUNTER — APPOINTMENT (OUTPATIENT)
Dept: RADIOLOGY | Facility: HOSPITAL | Age: 45
End: 2025-02-23
Payer: MEDICARE

## 2025-02-23 ENCOUNTER — HOSPITAL ENCOUNTER (EMERGENCY)
Facility: HOSPITAL | Age: 45
Discharge: HOME | End: 2025-02-24
Attending: FAMILY MEDICINE
Payer: MEDICARE

## 2025-02-23 ENCOUNTER — APPOINTMENT (OUTPATIENT)
Dept: CARDIOLOGY | Facility: HOSPITAL | Age: 45
End: 2025-02-23
Payer: MEDICARE

## 2025-02-23 DIAGNOSIS — K80.50 BILIARY COLIC: ICD-10-CM

## 2025-02-23 DIAGNOSIS — R10.11 RIGHT UPPER QUADRANT ABDOMINAL PAIN: Primary | ICD-10-CM

## 2025-02-23 DIAGNOSIS — R11.0 NAUSEA: ICD-10-CM

## 2025-02-23 LAB
ALBUMIN SERPL BCP-MCNC: 4.2 G/DL (ref 3.4–5)
ALP SERPL-CCNC: 98 U/L (ref 33–110)
ALT SERPL W P-5'-P-CCNC: 53 U/L (ref 7–45)
ANION GAP SERPL CALC-SCNC: 13 MMOL/L (ref 10–20)
AST SERPL W P-5'-P-CCNC: 39 U/L (ref 9–39)
BASOPHILS # BLD AUTO: 0.05 X10*3/UL (ref 0–0.1)
BASOPHILS NFR BLD AUTO: 0.5 %
BILIRUB SERPL-MCNC: 0.4 MG/DL (ref 0–1.2)
BUN SERPL-MCNC: 16 MG/DL (ref 6–23)
CALCIUM SERPL-MCNC: 9.4 MG/DL (ref 8.6–10.3)
CARDIAC TROPONIN I PNL SERPL HS: 9 NG/L (ref 0–13)
CHLORIDE SERPL-SCNC: 104 MMOL/L (ref 98–107)
CO2 SERPL-SCNC: 26 MMOL/L (ref 21–32)
CREAT SERPL-MCNC: 0.99 MG/DL (ref 0.5–1.05)
EGFRCR SERPLBLD CKD-EPI 2021: 73 ML/MIN/1.73M*2
EOSINOPHIL # BLD AUTO: 0.07 X10*3/UL (ref 0–0.7)
EOSINOPHIL NFR BLD AUTO: 0.7 %
ERYTHROCYTE [DISTWIDTH] IN BLOOD BY AUTOMATED COUNT: 12.9 % (ref 11.5–14.5)
ETHANOL SERPL-MCNC: <10 MG/DL
GLUCOSE SERPL-MCNC: 114 MG/DL (ref 74–99)
HCT VFR BLD AUTO: 42.8 % (ref 36–46)
HGB BLD-MCNC: 13.7 G/DL (ref 12–16)
IMM GRANULOCYTES # BLD AUTO: 0.11 X10*3/UL (ref 0–0.7)
IMM GRANULOCYTES NFR BLD AUTO: 1.1 % (ref 0–0.9)
LIPASE SERPL-CCNC: 92 U/L (ref 9–82)
LYMPHOCYTES # BLD AUTO: 3.1 X10*3/UL (ref 1.2–4.8)
LYMPHOCYTES NFR BLD AUTO: 30 %
MCH RBC QN AUTO: 27 PG (ref 26–34)
MCHC RBC AUTO-ENTMCNC: 32 G/DL (ref 32–36)
MCV RBC AUTO: 84 FL (ref 80–100)
MONOCYTES # BLD AUTO: 0.67 X10*3/UL (ref 0.1–1)
MONOCYTES NFR BLD AUTO: 6.5 %
NEUTROPHILS # BLD AUTO: 6.35 X10*3/UL (ref 1.2–7.7)
NEUTROPHILS NFR BLD AUTO: 61.2 %
NRBC BLD-RTO: 0 /100 WBCS (ref 0–0)
PLATELET # BLD AUTO: 393 X10*3/UL (ref 150–450)
POTASSIUM SERPL-SCNC: 3.9 MMOL/L (ref 3.5–5.3)
PROT SERPL-MCNC: 6.7 G/DL (ref 6.4–8.2)
RBC # BLD AUTO: 5.07 X10*6/UL (ref 4–5.2)
SODIUM SERPL-SCNC: 139 MMOL/L (ref 136–145)
WBC # BLD AUTO: 10.4 X10*3/UL (ref 4.4–11.3)

## 2025-02-23 PROCEDURE — 93005 ELECTROCARDIOGRAM TRACING: CPT

## 2025-02-23 PROCEDURE — 96374 THER/PROPH/DIAG INJ IV PUSH: CPT | Mod: 59

## 2025-02-23 PROCEDURE — 83690 ASSAY OF LIPASE: CPT | Performed by: FAMILY MEDICINE

## 2025-02-23 PROCEDURE — 84075 ASSAY ALKALINE PHOSPHATASE: CPT | Performed by: FAMILY MEDICINE

## 2025-02-23 PROCEDURE — 74022 RADEX COMPL AQT ABD SERIES: CPT | Performed by: RADIOLOGY

## 2025-02-23 PROCEDURE — 80053 COMPREHEN METABOLIC PANEL: CPT | Performed by: FAMILY MEDICINE

## 2025-02-23 PROCEDURE — 2500000005 HC RX 250 GENERAL PHARMACY W/O HCPCS: Performed by: FAMILY MEDICINE

## 2025-02-23 PROCEDURE — 84484 ASSAY OF TROPONIN QUANT: CPT | Performed by: FAMILY MEDICINE

## 2025-02-23 PROCEDURE — 96375 TX/PRO/DX INJ NEW DRUG ADDON: CPT

## 2025-02-23 PROCEDURE — 36415 COLL VENOUS BLD VENIPUNCTURE: CPT | Performed by: FAMILY MEDICINE

## 2025-02-23 PROCEDURE — 2500000001 HC RX 250 WO HCPCS SELF ADMINISTERED DRUGS (ALT 637 FOR MEDICARE OP): Performed by: FAMILY MEDICINE

## 2025-02-23 PROCEDURE — 74021 RADEX ABDOMEN 3+ VIEWS: CPT

## 2025-02-23 PROCEDURE — 2500000004 HC RX 250 GENERAL PHARMACY W/ HCPCS (ALT 636 FOR OP/ED): Performed by: FAMILY MEDICINE

## 2025-02-23 PROCEDURE — 85025 COMPLETE CBC W/AUTO DIFF WBC: CPT | Performed by: FAMILY MEDICINE

## 2025-02-23 PROCEDURE — 96361 HYDRATE IV INFUSION ADD-ON: CPT

## 2025-02-23 PROCEDURE — 99285 EMERGENCY DEPT VISIT HI MDM: CPT | Mod: 25 | Performed by: FAMILY MEDICINE

## 2025-02-23 PROCEDURE — 82077 ASSAY SPEC XCP UR&BREATH IA: CPT | Performed by: FAMILY MEDICINE

## 2025-02-23 PROCEDURE — 74177 CT ABD & PELVIS W/CONTRAST: CPT

## 2025-02-23 RX ORDER — LIDOCAINE HYDROCHLORIDE 20 MG/ML
15 SOLUTION OROPHARYNGEAL ONCE
Status: COMPLETED | OUTPATIENT
Start: 2025-02-23 | End: 2025-02-23

## 2025-02-23 RX ORDER — KETOROLAC TROMETHAMINE 15 MG/ML
15 INJECTION, SOLUTION INTRAMUSCULAR; INTRAVENOUS ONCE
Status: COMPLETED | OUTPATIENT
Start: 2025-02-23 | End: 2025-02-23

## 2025-02-23 RX ORDER — ALUMINUM HYDROXIDE, MAGNESIUM HYDROXIDE, AND SIMETHICONE 1200; 120; 1200 MG/30ML; MG/30ML; MG/30ML
30 SUSPENSION ORAL ONCE
Status: COMPLETED | OUTPATIENT
Start: 2025-02-23 | End: 2025-02-23

## 2025-02-23 RX ORDER — ONDANSETRON HYDROCHLORIDE 2 MG/ML
4 INJECTION, SOLUTION INTRAVENOUS ONCE
Status: COMPLETED | OUTPATIENT
Start: 2025-02-23 | End: 2025-02-23

## 2025-02-23 RX ORDER — FAMOTIDINE 10 MG/ML
20 INJECTION, SOLUTION INTRAVENOUS ONCE
Status: COMPLETED | OUTPATIENT
Start: 2025-02-23 | End: 2025-02-23

## 2025-02-23 RX ADMIN — SODIUM CHLORIDE 500 ML: 9 INJECTION, SOLUTION INTRAVENOUS at 22:52

## 2025-02-23 RX ADMIN — ALUMINUM HYDROXIDE, MAGNESIUM HYDROXIDE, AND DIMETHICONE 30 ML: 200; 20; 200 SUSPENSION ORAL at 22:54

## 2025-02-23 RX ADMIN — FAMOTIDINE 20 MG: 10 INJECTION, SOLUTION INTRAVENOUS at 22:54

## 2025-02-23 RX ADMIN — KETOROLAC TROMETHAMINE 15 MG: 15 INJECTION, SOLUTION INTRAMUSCULAR; INTRAVENOUS at 22:54

## 2025-02-23 RX ADMIN — ONDANSETRON 4 MG: 2 INJECTION INTRAMUSCULAR; INTRAVENOUS at 22:54

## 2025-02-23 RX ADMIN — LIDOCAINE HYDROCHLORIDE 15 ML: 20 SOLUTION ORAL at 22:54

## 2025-02-23 ASSESSMENT — PAIN DESCRIPTION - PAIN TYPE: TYPE: ACUTE PAIN

## 2025-02-23 ASSESSMENT — PAIN DESCRIPTION - FREQUENCY: FREQUENCY: CONSTANT/CONTINUOUS

## 2025-02-23 ASSESSMENT — PAIN SCALES - GENERAL
PAINLEVEL_OUTOF10: 0 - NO PAIN
PAINLEVEL_OUTOF10: 10 - WORST POSSIBLE PAIN

## 2025-02-23 ASSESSMENT — PAIN DESCRIPTION - ORIENTATION: ORIENTATION: UPPER

## 2025-02-23 ASSESSMENT — PAIN DESCRIPTION - DESCRIPTORS
DESCRIPTORS: STABBING
DESCRIPTORS: SHARP

## 2025-02-23 ASSESSMENT — PAIN - FUNCTIONAL ASSESSMENT: PAIN_FUNCTIONAL_ASSESSMENT: 0-10

## 2025-02-23 ASSESSMENT — PAIN DESCRIPTION - LOCATION: LOCATION: ABDOMEN

## 2025-02-24 ENCOUNTER — OFFICE VISIT (OUTPATIENT)
Facility: CLINIC | Age: 45
End: 2025-02-24
Payer: MEDICARE

## 2025-02-24 VITALS
WEIGHT: 223 LBS | HEART RATE: 78 BPM | HEIGHT: 69 IN | DIASTOLIC BLOOD PRESSURE: 72 MMHG | OXYGEN SATURATION: 98 % | BODY MASS INDEX: 33.03 KG/M2 | SYSTOLIC BLOOD PRESSURE: 118 MMHG

## 2025-02-24 VITALS
OXYGEN SATURATION: 100 % | BODY MASS INDEX: 31.1 KG/M2 | HEART RATE: 76 BPM | SYSTOLIC BLOOD PRESSURE: 133 MMHG | WEIGHT: 210 LBS | RESPIRATION RATE: 18 BRPM | TEMPERATURE: 97.8 F | HEIGHT: 69 IN | DIASTOLIC BLOOD PRESSURE: 85 MMHG

## 2025-02-24 DIAGNOSIS — R10.13 EPIGASTRIC PAIN: Primary | ICD-10-CM

## 2025-02-24 DIAGNOSIS — R19.7 DIARRHEA, UNSPECIFIED TYPE: ICD-10-CM

## 2025-02-24 DIAGNOSIS — K83.8 DILATED BILE DUCT: ICD-10-CM

## 2025-02-24 DIAGNOSIS — K76.9 LESION OF LIVER GREATER THAN 1 CM IN DIAMETER: ICD-10-CM

## 2025-02-24 LAB
HOLD SPECIMEN: NORMAL

## 2025-02-24 PROCEDURE — 2550000001 HC RX 255 CONTRASTS: Performed by: FAMILY MEDICINE

## 2025-02-24 PROCEDURE — 2500000005 HC RX 250 GENERAL PHARMACY W/O HCPCS: Performed by: FAMILY MEDICINE

## 2025-02-24 PROCEDURE — 2500000001 HC RX 250 WO HCPCS SELF ADMINISTERED DRUGS (ALT 637 FOR MEDICARE OP): Performed by: FAMILY MEDICINE

## 2025-02-24 PROCEDURE — 3078F DIAST BP <80 MM HG: CPT | Performed by: NURSE PRACTITIONER

## 2025-02-24 PROCEDURE — 99205 OFFICE O/P NEW HI 60 MIN: CPT | Performed by: NURSE PRACTITIONER

## 2025-02-24 PROCEDURE — 74177 CT ABD & PELVIS W/CONTRAST: CPT | Performed by: RADIOLOGY

## 2025-02-24 PROCEDURE — 3008F BODY MASS INDEX DOCD: CPT | Performed by: NURSE PRACTITIONER

## 2025-02-24 PROCEDURE — 3074F SYST BP LT 130 MM HG: CPT | Performed by: NURSE PRACTITIONER

## 2025-02-24 PROCEDURE — 1036F TOBACCO NON-USER: CPT | Performed by: NURSE PRACTITIONER

## 2025-02-24 RX ORDER — LIDOCAINE HYDROCHLORIDE 20 MG/ML
15 SOLUTION OROPHARYNGEAL ONCE
Status: COMPLETED | OUTPATIENT
Start: 2025-02-24 | End: 2025-02-24

## 2025-02-24 RX ORDER — ASPIRIN 81 MG/1
81 TABLET ORAL DAILY
COMMUNITY

## 2025-02-24 RX ORDER — MORPHINE SULFATE 2 MG/ML
2 INJECTION, SOLUTION INTRAMUSCULAR; INTRAVENOUS ONCE
Status: DISCONTINUED | OUTPATIENT
Start: 2025-02-24 | End: 2025-02-24

## 2025-02-24 RX ORDER — PANTOPRAZOLE SODIUM 40 MG/1
40 TABLET, DELAYED RELEASE ORAL
Qty: 90 TABLET | Refills: 1 | Status: SHIPPED | OUTPATIENT
Start: 2025-02-24

## 2025-02-24 RX ORDER — ALUMINUM HYDROXIDE, MAGNESIUM HYDROXIDE, AND SIMETHICONE 1200; 120; 1200 MG/30ML; MG/30ML; MG/30ML
30 SUSPENSION ORAL ONCE
Status: COMPLETED | OUTPATIENT
Start: 2025-02-24 | End: 2025-02-24

## 2025-02-24 RX ORDER — ESKETAMINE HYDROCHLORIDE 28 MG/.2ML
84 SOLUTION NASAL
COMMUNITY
Start: 2025-02-06

## 2025-02-24 RX ORDER — POLYETHYLENE GLYCOL 3350, SODIUM SULFATE ANHYDROUS, SODIUM BICARBONATE, SODIUM CHLORIDE, POTASSIUM CHLORIDE 236; 22.74; 6.74; 5.86; 2.97 G/4L; G/4L; G/4L; G/4L; G/4L
4000 POWDER, FOR SOLUTION ORAL ONCE
Qty: 4000 ML | Refills: 0 | Status: SHIPPED | OUTPATIENT
Start: 2025-02-24 | End: 2025-02-24

## 2025-02-24 RX ADMIN — LIDOCAINE HYDROCHLORIDE 15 ML: 20 SOLUTION ORAL at 00:48

## 2025-02-24 RX ADMIN — IOHEXOL 75 ML: 350 INJECTION, SOLUTION INTRAVENOUS at 00:07

## 2025-02-24 RX ADMIN — ALUMINUM HYDROXIDE, MAGNESIUM HYDROXIDE, AND DIMETHICONE 30 ML: 200; 20; 200 SUSPENSION ORAL at 00:48

## 2025-02-24 ASSESSMENT — ENCOUNTER SYMPTOMS
CHILLS: 0
BRUISES/BLEEDS EASILY: 0
SORE THROAT: 0
TROUBLE SWALLOWING: 0
WOUND: 0
ROS GI COMMENTS: SEE HPI
CONFUSION: 0
DIFFICULTY URINATING: 0
ADENOPATHY: 0
COUGH: 0
FEVER: 0
JOINT SWELLING: 0
DIZZINESS: 0
SHORTNESS OF BREATH: 0
WEAKNESS: 0
PALPITATIONS: 0
ARTHRALGIAS: 0

## 2025-02-24 ASSESSMENT — PAIN SCALES - GENERAL: PAINLEVEL_OUTOF10: 1

## 2025-02-24 ASSESSMENT — PAIN - FUNCTIONAL ASSESSMENT: PAIN_FUNCTIONAL_ASSESSMENT: 0-10

## 2025-02-24 NOTE — ED PROVIDER NOTES
HPI   Chief Complaint   Patient presents with    Abdominal Pain       44-year-old female comes ED with complaint of right upper quadrant and epigastric abdominal pain that began tonight a few hours earlier.  Patient reports it happened a few hours after eating and continued to persist.  Patient also felt very nauseous and vomited several times.  Patient reports she did not take the discomfort and came to the ED.  Patient in the ED is alert, cooperative, appears uncomfortable, but in no distress.  Patient reports no other associate symptoms or complaints.      History provided by:  Medical records and patient   used: No            Patient History   Past Medical History:   Diagnosis Date    Encounter for screening for malignant neoplasm of cervix 2013    Screening for malignant neoplasm of cervix    Maternal care for other abnormalities of cervix, unspecified trimester ([facility]) 2016    H/O LEEP (loop electrosurgical excision procedure) of cervix complicating pregnancy    Multiple births, unspecified, all liveborn ([facility])     Multiple births, all liveborn    Other conditions influencing health status     Menstruation    Personal history of diseases of the blood and blood-forming organs and certain disorders involving the immune mechanism     History of antiphospholipid syndrome    Personal history of other complications of pregnancy, childbirth and the puerperium     History of spontaneous     Personal history of other diseases of the circulatory system     History of high blood pressure    Personal history of other diseases of the female genital tract     Vaginal delivery    Personal history of other infectious and parasitic diseases 2019    History of varicella    Personal history of other specified conditions     History of abnormal Pap smear    Severe pre-eclampsia, unspecified trimester ([facility])     Severely increased blood pressure and swelling during pregnancy      Past Surgical History:   Procedure Laterality Date    CERVICAL BIOPSY  W/ LOOP ELECTRODE EXCISION  03/31/2014    Cervical Loop Electrosurgical Excision (LEEP)    DILATION AND CURETTAGE OF UTERUS  03/31/2014    Dilation And Curettage    HYSTERECTOMY      TONSILLECTOMY  04/19/2018    Tonsillectomy     Family History   Problem Relation Name Age of Onset    Adjustment Disorder with Mixed Anxiety and Depressed Mood Mother      Other (cardiac disorder) Mother      Depression Mother      Hypertension Mother      Stroke Mother      Hyperlipidemia Mother      Arthritis Maternal Grandmother      Other (cardiac disorder) Maternal Grandfather      Depression Maternal Grandfather      Adjustment Disorder with Mixed Anxiety and Depressed Mood Other grandfather     Other (cardiac disorder) Other grandfather     Hypertension Other grandfather     Stroke Other grandfather     Hyperlipidemia Other grandfather      Social History     Tobacco Use    Smoking status: Former     Types: Cigarettes    Smokeless tobacco: Never   Vaping Use    Vaping status: Every Day   Substance Use Topics    Alcohol use: Not on file    Drug use: Never       Physical Exam   ED Triage Vitals [02/23/25 2235]   Temperature Heart Rate Respirations BP   36.6 °C (97.8 °F) (!) 107 (!) 22 (!) 143/102      SpO2 Temp src Heart Rate Source Patient Position   100 % -- -- --      BP Location FiO2 (%)     -- --       Physical Exam  Vitals and nursing note reviewed.   Constitutional:       General: She is not in acute distress.     Appearance: She is well-developed.   HENT:      Head: Normocephalic and atraumatic.   Eyes:      Conjunctiva/sclera: Conjunctivae normal.   Cardiovascular:      Rate and Rhythm: Normal rate and regular rhythm.      Pulses: Normal pulses.      Heart sounds: Normal heart sounds, S1 normal and S2 normal. No murmur heard.  Pulmonary:      Effort: Pulmonary effort is normal. No respiratory distress.      Breath sounds: Normal breath sounds.    Abdominal:      General: Abdomen is flat.      Palpations: Abdomen is soft.      Tenderness: There is abdominal tenderness in the right upper quadrant and epigastric area. There is no right CVA tenderness, left CVA tenderness, guarding or rebound.      Hernia: No hernia is present.       Musculoskeletal:         General: No swelling.      Cervical back: Neck supple.   Skin:     General: Skin is warm and dry.      Capillary Refill: Capillary refill takes less than 2 seconds.   Neurological:      Mental Status: She is alert.   Psychiatric:         Mood and Affect: Mood normal.           ED Course & MDM   Diagnoses as of 02/24/25 0746   Right upper quadrant abdominal pain   Nausea   Biliary colic                 No data recorded     Bernadette Coma Scale Score: 15 (02/23/25 2244 : Marco Terrazas RN)                         Labs Reviewed   CBC WITH AUTO DIFFERENTIAL - Abnormal       Result Value    WBC 10.4      nRBC 0.0      RBC 5.07      Hemoglobin 13.7      Hematocrit 42.8      MCV 84      MCH 27.0      MCHC 32.0      RDW 12.9      Platelets 393      Neutrophils % 61.2      Immature Granulocytes %, Automated 1.1 (*)     Lymphocytes % 30.0      Monocytes % 6.5      Eosinophils % 0.7      Basophils % 0.5      Neutrophils Absolute 6.35      Immature Granulocytes Absolute, Automated 0.11      Lymphocytes Absolute 3.10      Monocytes Absolute 0.67      Eosinophils Absolute 0.07      Basophils Absolute 0.05     COMPREHENSIVE METABOLIC PANEL - Abnormal    Glucose 114 (*)     Sodium 139      Potassium 3.9      Chloride 104      Bicarbonate 26      Anion Gap 13      Urea Nitrogen 16      Creatinine 0.99      eGFR 73      Calcium 9.4      Albumin 4.2      Alkaline Phosphatase 98      Total Protein 6.7      AST 39      Bilirubin, Total 0.4      ALT 53 (*)    LIPASE - Abnormal    Lipase 92 (*)     Narrative:     Venipuncture immediately after or during the administration of Metamizole may lead to falsely low results. Testing  should be performed immediately prior to Metamizole dosing.   TROPONIN I, HIGH SENSITIVITY - Normal    Troponin I, High Sensitivity 9      Narrative:     Less than 99th percentile of normal range cutoff-  Female and children under 18 years old <14 ng/L; Male <21 ng/L: Negative  Repeat testing should be performed if clinically indicated.     Female and children under 18 years old 14-50 ng/L; Male 21-50 ng/L:  Consistent with possible cardiac damage and possible increased clinical   risk. Serial measurements may help to assess extent of myocardial damage.     >50 ng/L: Consistent with cardiac damage, increased clinical risk and  myocardial infarction. Serial measurements may help assess extent of   myocardial damage.      NOTE: Children less than 1 year old may have higher baseline troponin   levels and results should be interpreted in conjunction with the overall   clinical context.     NOTE: Troponin I testing is performed using a different   testing methodology at [facility] than at other   [facility] hospitals. Direct result comparisons should only   be made within the same method.   ALCOHOL - Normal    Alcohol <10     GRAY TOP    Extra Tube Hold for add-ons.       CT abdomen pelvis w IV contrast   Final Result   Mild central biliary ductal prominence. The extrahepatic common duct   is dilated up to 10 mm which is greater than expected for patient's   stated age. Correlate with bilirubin and alkaline phosphatase levels   for the need for further evaluation to exclude a distal obstructing   process.        There is a lobulated 3.5 cm hypodense lesion in the right hepatic   lobe which demonstrates peripheral discontinuous nodular enhancement   suggestive of a hemangioma.        2 cm hypodense lesion in the left kidney demonstrates fluid   attenuation suggestive of a cyst. Additional subcentimeter hypodense   foci are too small to characterize.        Bladder is partially distended with mild wall  thickening. Correlate   with urinalysis to exclude infectious cystitis.        Additional findings as noted above.        MACRO:   None        Signed by: Adeel Szymanski 2/24/2025 12:26 AM   Dictation workstation:   [id number]      XR abdomen 3+ views   Final Result   1. No acute cardiopulmonary abnormality identified.   2. Nonobstructive bowel gas pattern.                  Signed by: Marco Rodríguez 2/24/2025 12:02 AM   Dictation workstation:   [id number]          Medical Decision Making  Pt upon arrival to the ED appeared to be anxious and uncomfortable but in no distress with stable vitals.  Discussed with pt the presenting complaints and clinically findings.  Reviewed with pt the epic chart and counseled the patient on GI related complaints and appropriate approach to management/treatments.  After assessment and evaluation IV line was started, IV fluids given, labs, imaging ordered, given GI cocktail, IV Zofran, IV Toradol, IV Pepcid, placed on cardiac monitor, and observed.  After treatment and a period of rest patient was reassessed, feeling much better, pain had nearly resolved, patient longer felt nauseous, had no bouts of vomiting in the ED, tolerated p.o. challenge, and vital signs are stable.  Final results were reviewed/discussed with patient and they appear to be consistent with biliary colic.  Patient was educated on need for dietary changes better hydration and given resources to contact and follow-up with general surgery as needed.  Patient encouraged about with her primary care doctor next several days for recheck and order return to the ED if she feels her symptoms continue to recur and persist.  Patient stable and discharged home.      Amount and/or Complexity of Data Reviewed  External Data Reviewed: labs, radiology and notes.  Labs: ordered. Decision-making details documented in ED Course.  Radiology: ordered. Decision-making details documented in ED Course.    Risk  OTC  drugs.        Procedure  Procedures     Mckinley Frazier MD  02/24/25 0748

## 2025-02-24 NOTE — PROGRESS NOTES
Subjective   Patient ID: Chery Matias is a 44 y.o. female with PMH of LEEP, antiphospholipid syndrome, umbilical hernia with obstruction, anxiety, and depression who was referred by Barbra Cheung APR* for Colon Cancer Screening (Patient was also in ER with epigastric pain./CT abdomen/pelvis 2/23/25.).     Patient's PCP is RAMIREZ Carr-CNP     HPI  Patient drove 1.5 hours to this appt today. She is here with father.     Patient saw her PCP in July 2024 for diarrhea; she was given lomotil and referred to GI. She reports diarrhea for 2+ years and at one point was scheduled for a colonoscopy,  but cancelled the day prior. She has 5-6 BMs per day that are very loose. Sometimes, she goes 3 days without a BM. She denies any blood in stool or lower abdominal pain. No clear triggers for diarrhea. No prior work up.     She was in ER yesterday for RUQ and epigastric pain. This is the reason she made a same-day appt with GI today. She states this is the 2cd occurrence of severe abdominal pain. Yesterday, it was going straight through her back. She had nausea associated. The pain started after having a slice of pizza earlier in the day. CT A/P with contrast last night showed a 3.5cm RHL lesion likely a hemangioma, a dilation bile duct up to 10mm, gallbladder with no calcified stones. Lipase mildly elevated at 92, ALT mildly elevated at 53 but other LFTs were normal, CBC unremarkable. Her symptoms improved with famotidine, zofran, and tramadol. She was referred to general surgery for her bile duct dilation.     She has a history of antiphopholipid syndrome. She is not on anticoagulation for this; only aspirin 81mg.     No prior scopes     Summary of endoscopies:      Social Hx:  Tobacco: none   Etoh: none   Recreational drug use: none  NSAIDs: none       Family Hx:  No GI malignancy, IBD, or pancreatitis     Review of Systems:  Review of Systems   Constitutional:  Negative for chills and fever.   HENT:  Negative  for sore throat and trouble swallowing.    Respiratory:  Negative for cough and shortness of breath.    Cardiovascular:  Negative for chest pain and palpitations.   Gastrointestinal:         SEE HPI   Endocrine: Negative for cold intolerance and heat intolerance.   Genitourinary:  Negative for difficulty urinating.   Musculoskeletal:  Negative for arthralgias and joint swelling.   Skin:  Negative for rash and wound.   Neurological:  Negative for dizziness and weakness.   Hematological:  Negative for adenopathy. Does not bruise/bleed easily.   Psychiatric/Behavioral:  Negative for confusion.         Medications:  Prior to Admission medications    Medication Sig Start Date End Date Taking? Authorizing Provider   Adderall XR 20 mg 24 hr capsule Take 1 capsule (20 mg) by mouth once daily in the morning. 9/14/23   Historical Provider, MD   amphetamine-dextroamphetamine (Adderall) 10 mg tablet Take 1 tablet (10 mg) by mouth 2 times a day. 8/31/23   Historical Provider, MD   buPROPion XL (Wellbutrin XL) 300 mg 24 hr tablet Take 1 tablet (300 mg) by mouth once daily in the morning. Take before meals.    Historical Provider, MD   clonazePAM (KlonoPIN) 1 mg tablet Take 1 tablet (1 mg) by mouth 2 times a day. 9/20/22   Historical Provider, MD   desvenlafaxine 100 mg 24 hr tablet  7/29/24   Historical Provider, MD   diazePAM (Valium) 5 mg tablet Take 1 tablet (5 mg) by mouth 3 times a day. 7/13/24   Historical Provider, MD   enoxaparin (Lovenox) 40 mg/0.4 mL syringe  7/24/24   Historical Provider, MD   estradiol (Vivelle-DOT) 0.1 mg/24 hr patch APPLY 1 PATCH TO SKIN TWICE WEEKLY FOR 28 DAYS AS DIRECTED 7/19/24   Historical Provider, MD   famotidine (Pepcid) 20 mg tablet Take 1 tablet (20 mg) by mouth once daily. 9/23/24   Barbra Cheung, APRN-CNP   FLUoxetine (PROzac) 40 mg capsule Take 1 capsule (40 mg) by mouth once daily. 3/21/23   Historical Provider, MD   ibuprofen 600 mg tablet Take 1 tablet (600 mg) by mouth every 6  hours. 7/13/24   Historical Provider, MD   lumateperone (Caplyta) 42 mg capsule Take 1 capsule (42 mg) by mouth once daily.    Historical Provider, MD   methylphenidate CD (Metadate CD) 40 mg daily capsule  7/29/24   Historical Provider, MD   ondansetron ODT (Zofran-ODT) 4 mg disintegrating tablet DISSOLVE 1 TABLET ON THE TONGUE EVERY 6-8 HOURS AS DIRECTED 7/10/24   Historical Provider, MD   OXcarbazepine (Trileptal) 300 mg tablet Take 1 tablet (300 mg) by mouth 2 times a day. 2/27/23   Historical Provider, MD   oxyCODONE (Roxicodone) 10 mg immediate release tablet TAKE 2 TABLETS BY MOUTH EVERY 4 HOURS FOR ONGOING MANAGEMENT 7/13/24   Historical Provider, MD   Vraylar 6 mg capsule Take 1 capsule (6 mg) by mouth once daily. 9/12/23   Historical Provider, MD       Allergies:  Patient has no known allergies.    Past Medical History:  She has a past medical history of Anxiety (2000), Chronic diarrhea (2022), Depression (2004), Encounter for screening for malignant neoplasm of cervix (12/05/2013), GERD (gastroesophageal reflux disease) (2021), Maternal care for other abnormalities of cervix, unspecified trimester ([facility]) (02/26/2016), Multiple births, unspecified, all liveborn ([facility]), Other conditions influencing health status, Personal history of diseases of the blood and blood-forming organs and certain disorders involving the immune mechanism, Personal history of other complications of pregnancy, childbirth and the puerperium, Personal history of other diseases of the circulatory system, Personal history of other diseases of the female genital tract, Personal history of other infectious and parasitic diseases (04/12/2019), Personal history of other specified conditions, and Severe pre-eclampsia, unspecified trimester ([facility]).    Past Surgical History:  She has a past surgical history that includes Tonsillectomy (04/19/2018); Dilation and curettage of uterus (03/31/2014); Cervical biopsy w/ loop electrode  excision (03/31/2014); and Hysterectomy.    Social History:  She reports that she quit smoking about 13 months ago. Her smoking use included cigarettes. She has a 15 pack-year smoking history. She has never used smokeless tobacco. She reports that she does not currently use alcohol. She reports that she does not use drugs.    Objective   Physical exam:  Physical Exam  Constitutional:       General: She is not in acute distress.     Appearance: Normal appearance.   HENT:      Mouth/Throat:      Mouth: Mucous membranes are moist.      Comments: pink  Eyes:      General: No scleral icterus.     Conjunctiva/sclera: Conjunctivae normal.      Pupils: Pupils are equal, round, and reactive to light.   Cardiovascular:      Rate and Rhythm: Normal rate and regular rhythm.      Heart sounds: No murmur heard.  Pulmonary:      Effort: Pulmonary effort is normal.      Breath sounds: Normal breath sounds.   Abdominal:      General: Bowel sounds are normal. There is no distension.      Palpations: Abdomen is soft.      Tenderness: There is abdominal tenderness (epigastric). There is no guarding.   Skin:     General: Skin is warm and dry.      Coloration: Skin is not jaundiced.   Neurological:      Mental Status: She is alert and oriented to person, place, and time.   Psychiatric:         Mood and Affect: Mood normal.         Behavior: Behavior normal.          Assessment/Plan     CBD dilation, RHL lesion 3.5cm   Imaging last night showed a 3.5cm RHL lesion; likely hemangioma, as well as a mild biliary ductal prominence with extrahepatic duct 10mm. Gallbladder showed no stones. Will check MRI/MRCP for lesion and ductal dilation. Pt would like to see general surgeon; referral placed.     Epigastric pain   Epigastric pain that went straight through to back after eating pizza yesterday. Pain improved today after she received zofran, toradol, and famotidine yesterday. Will start PPI and check EGD due to severity of pain to ensure no  gastric pathology such as PUD is contributing, and also to check for celiac disease in light of chronic diarrhea she is having.     Diarrhea   Chronic diarrhea for about 2 years. Will check chronic diarrhea labs and recommend colonoscopy to look for celiac, EPI, colitis/microscopic colitis.       Patient lives far away. Wants procedures in [place] if possible.     IF PATIENT CHOOSES TO COME TO [place] FOR PROCEDURES, SCHEDULE IN ENDO. NO MEDS TO HOLD. GOLYTELY PREP.         60 minutes spent in total care of patient today.            Radha Lane, APRN-CNP

## 2025-02-24 NOTE — PATIENT INSTRUCTIONS
Noted. Thanks   Thank you for coming to your appointment today   - Please do your blood work and stool studies in the outpatient lab   - Schedule your MRI/MRCP   - I recommend an EGD and colonoscopy; if you choose to do it here call [phone]     Please call [phone] with any questions or concerns       If you utilize [employer] messages, please understand that these are intended to be used for simple and straightforward medical questions. If you have a more complex question or numerous complaints, an office appointment may be needed.

## 2025-02-24 NOTE — ED NOTES
Patient states she started having 10/10 stabbing epigastric pain that radiates to back. No SOB. Patient states pain started 30min prior to arrival. Pain worse when she lays down.     Rian Hammond RN  02/23/25 0929

## 2025-02-25 ENCOUNTER — PATIENT MESSAGE (OUTPATIENT)
Facility: CLINIC | Age: 45
End: 2025-02-25
Payer: COMMERCIAL

## 2025-02-25 DIAGNOSIS — R74.01 TRANSAMINITIS: Primary | ICD-10-CM

## 2025-02-25 LAB
ALBUMIN SERPL-MCNC: 4.1 G/DL (ref 3.6–5.1)
ALBUMIN/GLOB SERPL: 2.1 (CALC) (ref 1–2.5)
ALP SERPL-CCNC: 115 U/L (ref 31–125)
ALT SERPL-CCNC: 274 U/L (ref 6–29)
AST SERPL-CCNC: 261 U/L (ref 10–30)
BILIRUB DIRECT SERPL-MCNC: 0.2 MG/DL
BILIRUB INDIRECT SERPL-MCNC: 0.5 MG/DL (CALC) (ref 0.2–1.2)
BILIRUB SERPL-MCNC: 0.7 MG/DL (ref 0.2–1.2)
GLOBULIN SER CALC-MCNC: 2 G/DL (CALC) (ref 1.9–3.7)
LIPASE SERPL-CCNC: 58 U/L (ref 7–60)
PROT SERPL-MCNC: 6.1 G/DL (ref 6.1–8.1)

## 2025-02-26 ENCOUNTER — PATIENT MESSAGE (OUTPATIENT)
Facility: CLINIC | Age: 45
End: 2025-02-26
Payer: COMMERCIAL

## 2025-02-26 LAB
HAV IGM SERPL QL IA: NORMAL
HBV CORE IGM SERPL QL IA: NORMAL
HBV SURFACE AG SERPL QL IA: NORMAL
HCV AB SERPL QL IA: NORMAL
TTG IGA SER-ACNC: <1 U/ML

## 2025-02-27 LAB
C DIFF TOX GENS STL QL NAA+PROBE: NOT DETECTED
CALPROTECTIN STL-MCNT: NORMAL UG/G
ELASTASE PANC STL-MCNT: NORMAL UG/G

## 2025-02-28 ENCOUNTER — TELEPHONE (OUTPATIENT)
Dept: SURGERY | Facility: CLINIC | Age: 45
End: 2025-02-28
Payer: COMMERCIAL

## 2025-02-28 LAB
ATRIAL RATE: 108 BPM
P AXIS: 52 DEGREES
P OFFSET: 207 MS
P ONSET: 174 MS
PR INTERVAL: 100 MS
Q ONSET: 224 MS
QRS COUNT: 18 BEATS
QRS DURATION: 72 MS
QT INTERVAL: 336 MS
QTC CALCULATION(BAZETT): 450 MS
QTC FREDERICIA: 408 MS
R AXIS: 68 DEGREES
T AXIS: -7 DEGREES
T OFFSET: 392 MS
VENTRICULAR RATE: 108 BPM

## 2025-02-28 NOTE — TELEPHONE ENCOUNTER
I called the patient to make an appt for gallbladder/colonoscopy with Dr. Chen and the patient already has an appt with another doctor on 3/10/25.

## 2025-03-01 ENCOUNTER — HOSPITAL ENCOUNTER (OUTPATIENT)
Dept: RADIOLOGY | Facility: HOSPITAL | Age: 45
Discharge: HOME | End: 2025-03-01
Payer: MEDICARE

## 2025-03-01 DIAGNOSIS — K76.9 LESION OF LIVER GREATER THAN 1 CM IN DIAMETER: ICD-10-CM

## 2025-03-01 DIAGNOSIS — K83.8 DILATED BILE DUCT: ICD-10-CM

## 2025-03-01 PROCEDURE — 76376 3D RENDER W/INTRP POSTPROCES: CPT | Performed by: RADIOLOGY

## 2025-03-01 PROCEDURE — 74183 MRI ABD W/O CNTR FLWD CNTR: CPT

## 2025-03-01 PROCEDURE — 2550000001 HC RX 255 CONTRASTS: Performed by: REGISTERED NURSE

## 2025-03-01 PROCEDURE — A9575 INJ GADOTERATE MEGLUMI 0.1ML: HCPCS | Performed by: REGISTERED NURSE

## 2025-03-01 PROCEDURE — 74183 MRI ABD W/O CNTR FLWD CNTR: CPT | Performed by: RADIOLOGY

## 2025-03-01 RX ORDER — GADOTERATE MEGLUMINE 376.9 MG/ML
20 INJECTION INTRAVENOUS
Status: COMPLETED | OUTPATIENT
Start: 2025-03-01 | End: 2025-03-01

## 2025-03-01 RX ADMIN — GADOTERATE MEGLUMINE 20 ML: 376.9 INJECTION INTRAVENOUS at 11:57

## 2025-03-05 LAB
C DIFF TOX GENS STL QL NAA+PROBE: NOT DETECTED
CALPROTECTIN STL-MCNT: 95 MCG/G
ELASTASE PANC STL-MCNT: >800 MCG/G

## 2025-03-10 ENCOUNTER — APPOINTMENT (OUTPATIENT)
Dept: SURGERY | Facility: CLINIC | Age: 45
End: 2025-03-10
Payer: MEDICARE

## 2025-03-10 ENCOUNTER — PHARMACY VISIT (OUTPATIENT)
Dept: PHARMACY | Facility: CLINIC | Age: 45
End: 2025-03-10
Payer: MEDICARE

## 2025-03-10 VITALS
WEIGHT: 221 LBS | HEART RATE: 74 BPM | TEMPERATURE: 97.9 F | DIASTOLIC BLOOD PRESSURE: 77 MMHG | SYSTOLIC BLOOD PRESSURE: 118 MMHG | BODY MASS INDEX: 32.73 KG/M2 | HEIGHT: 69 IN

## 2025-03-10 DIAGNOSIS — Z12.11 SCREEN FOR COLON CANCER: ICD-10-CM

## 2025-03-10 DIAGNOSIS — R19.7 DIARRHEA, UNSPECIFIED TYPE: Primary | ICD-10-CM

## 2025-03-10 DIAGNOSIS — R10.84 GENERALIZED ABDOMINAL WALL PAIN: ICD-10-CM

## 2025-03-10 DIAGNOSIS — K80.20 CALCULUS OF GALLBLADDER WITHOUT CHOLECYSTITIS WITHOUT OBSTRUCTION: ICD-10-CM

## 2025-03-10 DIAGNOSIS — R79.89 ELEVATED LFTS: ICD-10-CM

## 2025-03-10 PROCEDURE — 3078F DIAST BP <80 MM HG: CPT | Performed by: SURGERY

## 2025-03-10 PROCEDURE — 99205 OFFICE O/P NEW HI 60 MIN: CPT | Performed by: SURGERY

## 2025-03-10 PROCEDURE — 1036F TOBACCO NON-USER: CPT | Performed by: SURGERY

## 2025-03-10 PROCEDURE — 3008F BODY MASS INDEX DOCD: CPT | Performed by: SURGERY

## 2025-03-10 PROCEDURE — RXMED WILLOW AMBULATORY MEDICATION CHARGE

## 2025-03-10 PROCEDURE — 3074F SYST BP LT 130 MM HG: CPT | Performed by: SURGERY

## 2025-03-10 RX ORDER — SODIUM, POTASSIUM,MAG SULFATES 17.5-3.13G
2 SOLUTION, RECONSTITUTED, ORAL ORAL AS NEEDED
Qty: 354 ML | Refills: 0 | Status: SHIPPED | OUTPATIENT
Start: 2025-03-10

## 2025-03-10 RX ORDER — CHOLESTYRAMINE 4 G/4.8G
4 POWDER, FOR SUSPENSION ORAL 2 TIMES DAILY
Qty: 60 PACKET | Refills: 2 | Status: SHIPPED | OUTPATIENT
Start: 2025-03-10 | End: 2025-06-08

## 2025-03-10 RX ORDER — CHOLESTYRAMINE 4 G/4.8G
4 POWDER, FOR SUSPENSION ORAL 2 TIMES DAILY
Qty: 60 PACKET | Refills: 11 | Status: SHIPPED | OUTPATIENT
Start: 2025-03-10 | End: 2025-03-10

## 2025-03-10 ASSESSMENT — ENCOUNTER SYMPTOMS: ABDOMINAL PAIN: 1

## 2025-03-10 NOTE — PATIENT INSTRUCTIONS
As discussed you do have a transient increase in your liver function test.  We have a single dependent gallstone on MRI, your ALT and AST are elevated.  At this time we will follow you conservatively.  I would like to repeat your ALT and AST.  For your diarrhea we will place you on oral cholestyramine to see if this helps as this could be related to bile salt colitis.  Will schedule for colonoscopy on July 7, 2025.  My office will provide you preprocedure instructions.

## 2025-03-10 NOTE — PROGRESS NOTES
Subjective   Patient ID: Chery Maitas is a 44 y.o. female who presents for abdominal pain.    HPI   This patient is history dates back to 1 year ago when she developed severe epigastric pain she was on her way to the emergency room but then the pain dissipated and she did not going to the emergency room.  Approximately 2 weeks ago she developed severe epigastric pain that went straight through to her back had nausea and felt unwell.  The pain was persistent.  She had significant discomfort that led her to the emergency room.  In the emergency room she was noted have a central duct prominence on CT scan.  There was a dilated duct measuring 10 mm.  At that time her liver functions test were entirely normal.  The following day ALT and AST were elevated to 261 and 271   Hepatitis panel was unremarkable.  She then subsequent an MRCP which confirmed a dilated duct but no obvious abnormality in the distal aspect with no obstructing stone or stricture.  Since that time she has done well.  She has had a previous open umbilical hernia repair performed.  She complains of nonspecific generalized abdominal wall tenderness he had a previous laparoscopic hysterectomy  The patient is also been complaining of chronic diarrhea.  Calprotectin level was 95  Past Medical History:   Diagnosis Date    Anxiety 2000    Chronic diarrhea 2022    Depression 2004    Encounter for screening for malignant neoplasm of cervix 12/05/2013    Screening for malignant neoplasm of cervix    GERD (gastroesophageal reflux disease) 2021    Maternal care for other abnormalities of cervix, unspecified trimester ([facility]) 02/26/2016    H/O LEEP (loop electrosurgical excision procedure) of cervix complicating pregnancy    Multiple births, unspecified, all liveborn ([facility])     Multiple births, all liveborn    Other conditions influencing health status     Menstruation    Personal history of diseases of the blood and blood-forming organs and certain disorders  involving the immune mechanism     History of antiphospholipid syndrome    Personal history of other complications of pregnancy, childbirth and the puerperium     History of spontaneous     Personal history of other diseases of the circulatory system     History of high blood pressure    Personal history of other diseases of the female genital tract     Vaginal delivery    Personal history of other infectious and parasitic diseases 2019    History of varicella    Personal history of other specified conditions     History of abnormal Pap smear    Severe pre-eclampsia, unspecified trimester ([facility])     Severely increased blood pressure and swelling during pregnancy        Current Outpatient Medications on File Prior to Visit   Medication Sig Dispense Refill    aspirin 81 mg EC tablet Take 1 tablet (81 mg) by mouth once daily.      desvenlafaxine 100 mg 24 hr tablet       diazePAM (Valium) 5 mg tablet Take 1 tablet (5 mg) by mouth 3 times a day. (Patient taking differently: Take 1 tablet (5 mg) by mouth 2 times a day.)      estradiol (Vivelle-DOT) 0.1 mg/24 hr patch APPLY 1 PATCH TO SKIN TWICE WEEKLY FOR 28 DAYS AS DIRECTED      lithium aspartate 20 mg capsule Take by mouth.      lumateperone (Caplyta) 42 mg capsule Take 1 capsule (42 mg) by mouth once daily.      methylphenidate CD (Metadate CD) 40 mg daily capsule  (Patient not taking: Reported on 2025)      ondansetron ODT (Zofran-ODT) 4 mg disintegrating tablet DISSOLVE 1 TABLET ON THE TONGUE EVERY 6-8 HOURS AS DIRECTED      pantoprazole (ProtoNix) 40 mg EC tablet Take 1 tablet (40 mg) by mouth once daily in the morning. Take before meals. Do not crush, chew, or split. 90 tablet 1    Spravato 84 mg (28 mg x 3) spray,non-aerosol nasal spray 3 sprays (84 mg).      UNABLE TO FIND L METHYLFOLATE  PATIENT GETTING OTC FROM PSYCH      vit D3-vit K2-ca fructoborate 20 mcg-180 mcg- 216 mg tablet Take by mouth.       No current facility-administered  medications on file prior to visit.        Review of Systems   Gastrointestinal:  Positive for abdominal pain.       Vitals:    03/10/25 1247   BP: 118/77   Pulse: 74   Temp: 36.6 °C (97.9 °F)        Objective     Physical Exam  Vitals reviewed. Exam conducted with a chaperone present.   Constitutional:       Appearance: Normal appearance.   HENT:      Head: Normocephalic.   Cardiovascular:      Rate and Rhythm: Normal rate and regular rhythm.      Heart sounds: Normal heart sounds.   Pulmonary:      Effort: Pulmonary effort is normal.      Breath sounds: Normal breath sounds.   Abdominal:      General: Abdomen is flat.      Palpations: Abdomen is soft. There is no mass.      Tenderness: There is no abdominal tenderness. There is no guarding.      Comments: Generalized abdominal tenderness   Musculoskeletal:         General: Normal range of motion.   Skin:     General: Skin is warm.   Neurological:      General: No focal deficit present.   Psychiatric:         Mood and Affect: Mood normal.     Review of MRCP performed 3 3.25  IMPRESSION:  1.  3.5 cm hemangioma in the right hepatic lobe. No suspicious renal  lesions otherwise identified.  2. Mild extrahepatic biliary dilatation without additional findings  to indicate bowel obstruction. Specifically, no intrahepatic biliary  dilatation, choledocholithiasis, biliary stricture, or extrinsic  compression identified. If there is no clinical evidence of biliary  obstruction, this is of doubtful clinical significance.  3. A few scattered left renal cysts appear simple.    Problem List Items Addressed This Visit       Diarrhea    Elevated LFTs - Primary    Relevant Orders    Hepatic Function Panel    Generalized abdominal wall pain    Gallstone        Assessment/Plan   Plan-findings discussed at length with the patient.  It is possible that she might have had an episode of gallstone pancreatitis though this seems less likely.  She did have a mildly elevated lipase and ALT  and AST were elevated.  At this time is appropriate to follow conservatively based on the normal MRI.  Will repeat the liver function test.  In addition she is having chronic diarrhea.  This could be related to bile salt colitis.  Will prescribe a trial of cholestyramine.  She would like to have a colonoscopy and we will schedule this on July 7, 2025.    Brendon Lópze MD

## 2025-03-11 ENCOUNTER — TELEPHONE (OUTPATIENT)
Dept: SURGERY | Facility: CLINIC | Age: 45
End: 2025-03-11
Payer: COMMERCIAL

## 2025-03-11 ENCOUNTER — APPOINTMENT (OUTPATIENT)
Dept: RADIOLOGY | Facility: HOSPITAL | Age: 45
End: 2025-03-11
Payer: COMMERCIAL

## 2025-03-11 LAB
ALBUMIN SERPL-MCNC: 4.6 G/DL (ref 3.6–5.1)
ALBUMIN/GLOB SERPL: 2 (CALC) (ref 1–2.5)
ALP SERPL-CCNC: 110 U/L (ref 31–125)
ALT SERPL-CCNC: 44 U/L (ref 6–29)
AST SERPL-CCNC: 21 U/L (ref 10–30)
BILIRUB DIRECT SERPL-MCNC: 0.1 MG/DL
BILIRUB INDIRECT SERPL-MCNC: 0.5 MG/DL (CALC) (ref 0.2–1.2)
BILIRUB SERPL-MCNC: 0.6 MG/DL (ref 0.2–1.2)
GLOBULIN SER CALC-MCNC: 2.3 G/DL (CALC) (ref 1.9–3.7)
PROT SERPL-MCNC: 6.9 G/DL (ref 6.1–8.1)

## 2025-03-21 ENCOUNTER — APPOINTMENT (OUTPATIENT)
Dept: SURGERY | Facility: CLINIC | Age: 45
End: 2025-03-21
Payer: COMMERCIAL

## 2025-03-31 DIAGNOSIS — G89.29 CHRONIC MIDLINE LOW BACK PAIN WITHOUT SCIATICA: Primary | ICD-10-CM

## 2025-03-31 DIAGNOSIS — M54.50 CHRONIC MIDLINE LOW BACK PAIN WITHOUT SCIATICA: Primary | ICD-10-CM

## 2025-03-31 RX ORDER — TRAMADOL HYDROCHLORIDE 50 MG/1
50 TABLET ORAL EVERY 6 HOURS PRN
Qty: 15 TABLET | Refills: 0 | Status: SHIPPED | OUTPATIENT
Start: 2025-03-31 | End: 2025-04-07

## 2025-04-15 DIAGNOSIS — R19.7 DIARRHEA, UNSPECIFIED TYPE: ICD-10-CM

## 2025-04-15 DIAGNOSIS — R10.13 EPIGASTRIC PAIN: ICD-10-CM

## 2025-04-25 ENCOUNTER — EVALUATION (OUTPATIENT)
Dept: PHYSICAL THERAPY | Facility: HOSPITAL | Age: 45
End: 2025-04-25
Payer: MEDICARE

## 2025-04-25 DIAGNOSIS — G89.29 CHRONIC PAIN: ICD-10-CM

## 2025-04-25 DIAGNOSIS — G89.29 CHRONIC MIDLINE LOW BACK PAIN WITHOUT SCIATICA: ICD-10-CM

## 2025-04-25 DIAGNOSIS — M54.40 LUMBAGO OF LUMBAR REGION WITH SCIATICA: Primary | ICD-10-CM

## 2025-04-25 DIAGNOSIS — M54.50 CHRONIC MIDLINE LOW BACK PAIN WITHOUT SCIATICA: ICD-10-CM

## 2025-04-25 PROCEDURE — 97163 PT EVAL HIGH COMPLEX 45 MIN: CPT | Mod: GP | Performed by: PHYSICAL THERAPIST

## 2025-04-25 PROCEDURE — 97110 THERAPEUTIC EXERCISES: CPT | Mod: GP | Performed by: PHYSICAL THERAPIST

## 2025-04-25 SDOH — ECONOMIC STABILITY: FOOD INSECURITY: WITHIN THE PAST 12 MONTHS, THE FOOD YOU BOUGHT JUST DIDN'T LAST AND YOU DIDN'T HAVE MONEY TO GET MORE.: NEVER TRUE

## 2025-04-25 SDOH — ECONOMIC STABILITY: FOOD INSECURITY: WITHIN THE PAST 12 MONTHS, YOU WORRIED THAT YOUR FOOD WOULD RUN OUT BEFORE YOU GOT MONEY TO BUY MORE.: NEVER TRUE

## 2025-04-25 ASSESSMENT — PATIENT HEALTH QUESTIONNAIRE - PHQ9
SUM OF ALL RESPONSES TO PHQ9 QUESTIONS 1 AND 2: 0
1. LITTLE INTEREST OR PLEASURE IN DOING THINGS: NOT AT ALL
2. FEELING DOWN, DEPRESSED OR HOPELESS: NOT AT ALL

## 2025-04-25 ASSESSMENT — ENCOUNTER SYMPTOMS
OCCASIONAL FEELINGS OF UNSTEADINESS: 0
DEPRESSION: 1
LOSS OF SENSATION IN FEET: 0

## 2025-04-25 ASSESSMENT — COLUMBIA-SUICIDE SEVERITY RATING SCALE - C-SSRS
2. HAVE YOU ACTUALLY HAD ANY THOUGHTS OF KILLING YOURSELF?: NO
1. IN THE PAST MONTH, HAVE YOU WISHED YOU WERE DEAD OR WISHED YOU COULD GO TO SLEEP AND NOT WAKE UP?: NO
6. HAVE YOU EVER DONE ANYTHING, STARTED TO DO ANYTHING, OR PREPARED TO DO ANYTHING TO END YOUR LIFE?: NO

## 2025-04-25 NOTE — PROGRESS NOTES
Physical Therapy  Physical Therapy Orthopedic Evaluation    Patient Name: Chery Matias  MRN: [id number]  Encounter Date: 4/25/2025  Time Calculation  Start Time: 1345  Stop Time: 1430  Time Calculation (min): 45 min    PT Evaluation Time Entry  PT Evaluation (Complex) Time Entry: 35  PT Therapeutic Procedures Time Entry  Therapeutic Exercise Time Entry: 10                   Insurance:  Visit number: 1 of 16(60/year pt/ot/slp/chiro)  Authorization info: no auth  Payor: MEDICARE / Plan: MEDICARE PART A AND B / Product Type: *No Product type* /     Current Problem  Problem List Items Addressed This Visit    None  Visit Diagnoses         Codes      Lumbago of lumbar region with sciatica    -  Primary M54.40    Relevant Orders    Follow Up In Physical Therapy      Chronic midline low back pain without sciatica     M54.50, G89.29      Chronic pain     G89.29          1. Lumbago of lumbar region with sciatica  Follow Up In Physical Therapy      2. Chronic midline low back pain without sciatica  Referral to Physical Therapy      3. Chronic pain            General:  General  Reason for Referral: to address chronic LBP with acute exacerbation  Referred By: RAMIREZ Hilliard CNP  Past Medical History Relevant to Rehab: HTN, Vertigo, PCOS, GERD, Herrnia, hemrroids, pelvic floor dysfunction,Gall stone, menorrhalgia, anemia, HSV-2, MDD      Precautions:   Precautions  Medical Precautions: No known precautions/limitation    Medical History Form: Reviewed (scanned into chart)    Subjective:   Subjective   Chief Complaint: Patient presents to clinic lumbar pain  with radiculopathy with chronic pain. pt  Onset Date: chronic pain with acute exacerbation  AGNES: Chronic    Current Condition:       Pain:     Highest: 10/10 pain  Lowest: 5/10 pain  Location: sacrum  Description: constant aching, intense  Aggravating Factors:  bending  Relieving Factors:  laying on the couch    Relevant Information (PMH & Previous Tests/Imaging): see  flow PMH  Previous Interventions/Treatments: None    Prior Level of Function (PLOF)  Patient previously independent with all ADLs  Exercise/Physical Activity: bowling and softball  Hobbies: travel/camping    Patients Living Environment: Reviewed and no concern    Primary Language: English    Patient's Goal(s) for Therapy: return to gardening    Red Flags: Do you have any of the following? No  Fever/chills, unexplained weight changes, dizziness/fainting, unexplained change in bowel or bladder functions, unexplained malaise or muscle weakness, night pain/sweats, numbness or tingling    Objective:  Objective           ROM    Lumbar AROM (%)    Flexion: 60  Extension: 5 pain  (L) Side Bend: 15  (R) Side Bend: 15  (L) Rotation: pain wnl  (R) Rotation: wnl      Hip AROM (Degrees)      (R)  (L)  Flexion: 90  90  Extension: 5  5    Abduction: na  na    Adduction: wnl  wnl    ER:  25  45    IR:  25  45                Strength Testing    Core/Abdominals: 3-/5    Hip      (R)  (L)  Flexion: 4  4     Extension: 4  4    Abduction: 4  4    Adduction: 4  4    ER:  3  3    IR:  3  3      Knee    (R)  (L)  Flexion: 4  4      Extension: 4  4     Ankle    (R)  (L)  Plantarflexion: 4  4      Dorsiflexion: 4  4             Functional Screening    Lower Extremity Functional Movements  Transfers: Independent  Gait: antalgic  Assistive Device: no device      Palpation: PFT upper gluteals and R QL    Joint Mobility: restricted on R lower quadrant but pain is bl sacrum            Special Tests    Response to repeated L/S movements for directional preference: extension worse, return from flexion worse  Leg Length Discrepancy: dna  SI Alignment: R SIJ rigid no significant misalignment  Hip Scouring: neg  SI Compression Test: better  SI Distraction Test: better  90-90 SLR Test: +  Jay Test: +  MORGAN Test: -  Slump Test: +  Stephon's Test: -          Outcome Measures:  Other Measures  Oswestry Disablity Index (LA NENA): 27/50 significant disability      EDUCATION:   Individual(s) Educated: on tra activation and mechanism for shear on a degenerative spine  Education Provided: Home exercise program, plan of care, activity modifications, pain management, and injury pathology  Handout(s) Provided: Scanned into chart  Home Program: See below treatment  Risk and Benefits Discussed with Patient/Caregiver/Other: Yes   Patient/Caregiver Demonstrated Understanding: Yes   Plan of Care Discussed and Agreed Upon: Yes   Patient Response to Education: Patient/Caregiver verbalized understanding of information, Patient/Caregiver performed return demonstration of exercises/activities, and Patient/Caregiver asked appropriate questions    Assessment: Patient presents with signs and symptoms consistent with SIJ dysfunction ans lumbago, resulting in limited participation in pain-free ADLs and inability to perform at their prior level of function. Skilled PT warranted to address the above stated impairments, so the patient can perform FA's without increased pain or difficulty.         Clinical Presentation: Evolving with changing characteristics    Complexity: high complexity given PMH and comorbidity     Plan:  Treatment/Interventions: Therapeutic exercises, Therapeutic activities, Taping techniques, Neuromuscular re-education, Mechanical traction, Manual therapy, Gait training, Hot pack, Electrical stimulation, Education/ Instruction  PT Plan: Skilled PT  PT Frequency: 2 times per week  Duration: 8 weeks  Onset Date: 03/14/25  Certification Period Start Date: 04/25/25  Certification Period End Date: 06/20/25  Number of Treatments Authorized: 1 of 16 (60 visit/year)  Rehab Potential: Fair  Plan of Care Agreement: Patient    Goals: Set and discussed today  Active       PT Problem       pt will be independent with HEP       Start:  04/25/25    Expected End:  06/20/25            Patient will display 90 degrees of lumbar flexion       Start:  04/25/25    Expected End:  06/20/25             pt R hip will display 90 degrees of arc rotation       Start:  04/25/25    Expected End:  06/20/25            Patient will report 2/10 pain or less       Start:  04/25/25    Expected End:  06/20/25            Patient Stated Goal: patient will return to leisure gardening       Start:  04/25/25    Expected End:  06/20/25                Plan of care was developed with input and agreement by the patient      Treatment Performed:  Therapeutic Exercise  Therapeutic Exercise Performed: Yes  Therapeutic Exercise Activity 1: trA activation hooklying x10  Therapeutic Exercise Activity 2: nv hip er/ir in SL  Therapeutic Exercise Activity 3: NV trA with heel slide  Therapeutic Exercise Activity 4: NV LTR    Ivelisse Urena, PT

## 2025-06-16 ENCOUNTER — ANESTHESIA EVENT (OUTPATIENT)
Dept: GASTROENTEROLOGY | Facility: HOSPITAL | Age: 45
End: 2025-06-16
Payer: MEDICARE

## 2025-06-16 PROBLEM — O99.210 OBESITY IN PREGNANCY (HHS-HCC): Status: RESOLVED | Noted: 2023-03-24 | Resolved: 2025-06-16

## 2025-06-16 PROBLEM — E66.811 CLASS 1 OBESITY IN ADULT: Status: ACTIVE | Noted: 2025-06-16

## 2025-06-16 NOTE — ANESTHESIA PREPROCEDURE EVALUATION
Patient: Chery Matias    Procedure Information       Date/Time: 07/07/25 0930    Scheduled providers: Brendon López MD    Procedures:       EGD      COLONOSCOPY    Location: [facility]            Relevant Problems   Anesthesia (within normal limits)      Cardiac   (+) Breast pain      Pulmonary (within normal limits)      Neuro   (+) Anxiety   (+) MDD (major depressive disorder), recurrent, in partial remission      GI   (+) GERD (gastroesophageal reflux disease)      Liver   (+) Elevated LFTs   (+) Gallstone      Endocrine   (+) Class 1 obesity in adult      Hematology   (+) Anemia   (+) Antiphospholipid syndrome (Multi)      Musculoskeletal (within normal limits)      HEENT (within normal limits)      Skin (within normal limits)      GYN   (+) Abnormal uterine bleeding (AUB)   (+) Menorrhagia      Cardiac and Vasculature   (+) H/O: HTN (hypertension)     There were no vitals filed for this visit.    Surgical History[1]  Medical History[2]  Current Medications[3]  Prior to Admission medications    Medication Sig Start Date End Date Taking? Authorizing Provider   aspirin 81 mg EC tablet Take 1 tablet (81 mg) by mouth once daily.    Historical Provider, MD   cholestyramine light (Prevalite) 4 gram packet Take 1 packet (4 g) by mouth 2 times a day. 3/10/25 6/8/25  Brendon López MD   sodium,potassium,mag sulfates (Suprep Bowel Prep Kit) 17.5-3.13-1.6 gram solution Take 2 bottles by mouth if needed (Kit prep take one bottle at 6pm night before procedure and take second bottle at 4 am day of procedure). 3/10/25   Brendon López MD   desvenlafaxine 100 mg 24 hr tablet  7/29/24   Historical Provider, MD   diazePAM (Valium) 5 mg tablet Take 1 tablet (5 mg) by mouth 3 times a day.  Patient taking differently: Take 1 tablet (5 mg) by mouth 2 times a day. 7/13/24   Historical Provider, MD   estradiol (Vivelle-DOT) 0.1 mg/24 hr patch APPLY 1 PATCH TO SKIN TWICE WEEKLY FOR 28 DAYS AS DIRECTED 7/19/24    Historical Provider, MD   lithium aspartate 20 mg capsule Take by mouth.    Historical Provider, MD   lumateperone (Caplyta) 42 mg capsule Take 1 capsule (42 mg) by mouth once daily.    Historical Provider, MD   methylphenidate CD (Metadate CD) 40 mg daily capsule  24   Historical Provider, MD   ondansetron ODT (Zofran-ODT) 4 mg disintegrating tablet DISSOLVE 1 TABLET ON THE TONGUE EVERY 6-8 HOURS AS DIRECTED 7/10/24   Historical Provider, MD   pantoprazole (ProtoNix) 40 mg EC tablet Take 1 tablet (40 mg) by mouth once daily in the morning. Take before meals. Do not crush, chew, or split. 25   Radha Lane, APRN-CNP   Spravato 84 mg (28 mg x 3) spray,non-aerosol nasal spray 3 sprays (84 mg). 25   Historical Provider, MD   UNABLE TO FIND L METHYLFOLATE  PATIENT GETTING OTC FROM PSYCH    Historical Provider, MD   vit D3-vit K2-ca fructoborate 20 mcg-180 mcg- 216 mg tablet Take by mouth.    Historical Provider, MD     RX Allergies[4]  Social History     Tobacco Use    Smoking status: Former     Current packs/day: 0.00     Average packs/day: 1 pack/day for 15.0 years (15.0 ttl pk-yrs)     Types: Cigarettes     Quit date: 2024     Years since quittin.4    Smokeless tobacco: Never   Substance Use Topics    Alcohol use: Not Currently     Comment: Quit almost a year ago         Chemistry    Lab Results   Component Value Date/Time     2025    K 3.9 2025     2025    CO2 26 2025    BUN 16 2025    CREATININE 0.99 2025    Lab Results   Component Value Date/Time    CALCIUM 9.4 2025    ALKPHOS 110 03/10/2025 1417    AST 21 03/10/2025 1417    ALT 44 (H) 03/10/2025 1417    BILITOT 0.6 03/10/2025 1417          Lab Results   Component Value Date/Time    WBC 10.4 2025    HGB 13.7 2025    HCT 42.8 2025     2025     Lab Results   Component Value Date/Time    PROTIME 12.0  08/31/2021 1420    INR 1.0 08/31/2021 1420     Encounter Date: 02/23/25   ECG 12 lead   Result Value    Ventricular Rate 108    Atrial Rate 108    UT Interval 100    QRS Duration 72    QT Interval 336    QTC Calculation(Bazett) 450    P Axis 52    R Axis 68    T Axis -7    QRS Count 18    Q Onset 224    P Onset 174    P Offset 207    T Offset 392    QTC Fredericia 408    Narrative    Sinus tachycardia with short UT  Low voltage QRS  Cannot rule out Anterior infarct , age undetermined  Abnormal ECG  When compared with ECG of 28-APR-2022 14:47,  QT has lengthened  See ED provider note for full interpretation and clinical correlation  Confirmed by Delores Chauhan ([id number]) on 2/28/2025 12:45:29 PM     No results found for this or any previous visit from the past 1095 days.    Clinical information reviewed:                 Chart reviewed.  No clearances ordered.    NPO Detail:  No data recorded     Physical Exam    Airway  Mallampati: III  TM distance: >3 FB  Mouth opening: 3 or more finger widths     Cardiovascular - normal exam   Dental - normal exam     Pulmonary - normal exam   Abdominal (+) obese             Anesthesia Plan    History of general anesthesia?: yes  History of complications of general anesthesia?: yes    ASA 3     MAC     The patient is not a current smoker.    intravenous induction   Anesthetic plan and risks discussed with patient.  Use of blood products discussed with patient who consented to blood products.             [1]   Past Surgical History:  Procedure Laterality Date    CERVICAL BIOPSY  W/ LOOP ELECTRODE EXCISION  03/31/2014    Cervical Loop Electrosurgical Excision (LEEP)    DILATION AND CURETTAGE OF UTERUS  03/31/2014    Dilation And Curettage    HYSTERECTOMY      TONSILLECTOMY  04/19/2018    Tonsillectomy   [2]   Past Medical History:  Diagnosis Date    Anxiety 2000    Chronic diarrhea 2022    Depression 2004    Encounter for screening for malignant neoplasm of cervix 12/05/2013     Screening for malignant neoplasm of cervix    GERD (gastroesophageal reflux disease)     Maternal care for other abnormalities of cervix, unspecified trimester ([facility]) 2016    H/O LEEP (loop electrosurgical excision procedure) of cervix complicating pregnancy    Multiple births, unspecified, all liveborn ([facility])     Multiple births, all liveborn    Other conditions influencing health status     Menstruation    Personal history of diseases of the blood and blood-forming organs and certain disorders involving the immune mechanism     History of antiphospholipid syndrome    Personal history of other complications of pregnancy, childbirth and the puerperium     History of spontaneous     Personal history of other diseases of the circulatory system     History of high blood pressure    Personal history of other diseases of the female genital tract     Vaginal delivery    Personal history of other infectious and parasitic diseases 2019    History of varicella    Personal history of other specified conditions     History of abnormal Pap smear    Severe pre-eclampsia, unspecified trimester ([facility])     Severely increased blood pressure and swelling during pregnancy   [3]   Current Outpatient Medications:     aspirin 81 mg EC tablet, Take 1 tablet (81 mg) by mouth once daily., Disp: , Rfl:     cholestyramine light (Prevalite) 4 gram packet, Take 1 packet (4 g) by mouth 2 times a day., Disp: 60 packet, Rfl: 2    sodium,potassium,mag sulfates (Suprep Bowel Prep Kit) 17.5-3.13-1.6 gram solution, Take 2 bottles by mouth if needed (Kit prep take one bottle at 6pm night before procedure and take second bottle at 4 am day of procedure)., Disp: 354 mL, Rfl: 0    desvenlafaxine 100 mg 24 hr tablet, , Disp: , Rfl:     diazePAM (Valium) 5 mg tablet, Take 1 tablet (5 mg) by mouth 3 times a day. (Patient taking differently: Take 1 tablet (5 mg) by mouth 2 times a day.), Disp: , Rfl:     estradiol  (Vivelle-DOT) 0.1 mg/24 hr patch, APPLY 1 PATCH TO SKIN TWICE WEEKLY FOR 28 DAYS AS DIRECTED, Disp: , Rfl:     lithium aspartate 20 mg capsule, Take by mouth., Disp: , Rfl:     lumateperone (Caplyta) 42 mg capsule, Take 1 capsule (42 mg) by mouth once daily., Disp: , Rfl:     methylphenidate CD (Metadate CD) 40 mg daily capsule, , Disp: , Rfl:     ondansetron ODT (Zofran-ODT) 4 mg disintegrating tablet, DISSOLVE 1 TABLET ON THE TONGUE EVERY 6-8 HOURS AS DIRECTED, Disp: , Rfl:     pantoprazole (ProtoNix) 40 mg EC tablet, Take 1 tablet (40 mg) by mouth once daily in the morning. Take before meals. Do not crush, chew, or split., Disp: 90 tablet, Rfl: 1    Spravato 84 mg (28 mg x 3) spray,non-aerosol nasal spray, 3 sprays (84 mg)., Disp: , Rfl:     UNABLE TO FIND, L METHYLFOLATE PATIENT GETTING OTC FROM PSYCH, Disp: , Rfl:     vit D3-vit K2-ca fructoborate 20 mcg-180 mcg- 216 mg tablet, Take by mouth., Disp: , Rfl:   [4] No Known Allergies

## 2025-07-01 ENCOUNTER — PRE-ADMISSION TESTING (OUTPATIENT)
Dept: PREADMISSION TESTING | Facility: HOSPITAL | Age: 45
End: 2025-07-01
Payer: COMMERCIAL

## 2025-07-01 VITALS — BODY MASS INDEX: 31.31 KG/M2 | WEIGHT: 212 LBS

## 2025-07-01 RX ORDER — OLANZAPINE 2.5 MG/1
2.5 TABLET, FILM COATED ORAL NIGHTLY
COMMUNITY

## 2025-07-01 RX ORDER — DESVENLAFAXINE 100 MG/1
100 TABLET, EXTENDED RELEASE ORAL DAILY
COMMUNITY

## 2025-07-01 RX ORDER — LISDEXAMFETAMINE DIMESYLATE 50 MG/1
50 CAPSULE ORAL
COMMUNITY
Start: 2025-06-16

## 2025-07-01 RX ORDER — PROGESTERONE 100 MG/1
100 CAPSULE ORAL DAILY
COMMUNITY

## 2025-07-01 RX ORDER — MULTIVITAMIN/IRON/FOLIC ACID 18MG-0.4MG
1 TABLET ORAL DAILY
COMMUNITY

## 2025-07-01 ASSESSMENT — DUKE ACTIVITY SCORE INDEX (DASI)
TOTAL_SCORE: 50.7
CAN YOU DO MODERATE WORK AROUND THE HOUSE LIKE VACUUMING, SWEEPING FLOORS OR CARRYING GROCERIES: YES
CAN YOU DO LIGHT WORK AROUND THE HOUSE LIKE DUSTING OR WASHING DISHES: YES
CAN YOU CLIMB A FLIGHT OF STAIRS OR WALK UP A HILL: YES
CAN YOU RUN A SHORT DISTANCE: YES
CAN YOU PARTICIPATE IN STRENOUS SPORTS LIKE SWIMMING, SINGLES TENNIS, FOOTBALL, BASKETBALL, OR SKIING: NO
CAN YOU WALK INDOORS, SUCH AS AROUND YOUR HOUSE: YES
DASI METS SCORE: 9
CAN YOU TAKE CARE OF YOURSELF (EAT, DRESS, BATHE, OR USE TOILET): YES
CAN YOU PARTICIPATE IN MODERATE RECREATIONAL ACTIVITIES LIKE GOLF, BOWLING, DANCING, DOUBLES TENNIS OR THROWING A BASEBALL OR FOOTBALL: YES
CAN YOU HAVE SEXUAL RELATIONS: YES
CAN YOU DO HEAVY WORK AROUND THE HOUSE LIKE SCRUBBING FLOORS OR LIFTING AND MOVING HEAVY FURNITURE: YES
CAN YOU WALK A BLOCK OR TWO ON LEVEL GROUND: YES
CAN YOU DO YARD WORK LIKE RAKING LEAVES, WEEDING OR PUSHING A MOWER: YES

## 2025-07-01 NOTE — PREPROCEDURE INSTRUCTIONS
Medication List            Accurate as of July 1, 2025  2:55 PM. Always use your most recent med list.                aspirin 81 mg EC tablet  Medication Adjustments for Surgery: Do Not take on the morning of surgery     b complex 0.4 mg tablet  Additional Medication Adjustments for Surgery: Take last dose 7 days before surgery     * Caplyta 42 mg capsule  Generic drug: lumateperone  Medication Adjustments for Surgery: Do Not take on the morning of surgery     * Caplyta 42 mg capsule  Generic drug: lumateperone  Medication Adjustments for Surgery: Do Not take on the morning of surgery     cholestyramine light 4 gram packet  Commonly known as: Prevalite  Take 1 packet (4 g) by mouth 2 times a day.  Medication Adjustments for Surgery: Do Not take on the morning of surgery     desvenlafaxine 100 mg 24 hr tablet  Commonly known as: Pristiq  Medication Adjustments for Surgery: Do Not take on the morning of surgery     diazePAM 5 mg tablet  Commonly known as: Valium  Medication Adjustments for Surgery: Do Not take on the morning of surgery     estradiol 0.1 mg/24 hr patch  Commonly known as: Vivelle-DOT  Medication Adjustments for Surgery: Do Not take on the morning of surgery     lisdexamfetamine 50 mg capsule  Commonly known as: Vyvanse  Medication Adjustments for Surgery: Do Not take on the morning of surgery     lithium aspartate 20 mg capsule  Medication Adjustments for Surgery: Do Not take on the morning of surgery     OLANZapine 2.5 mg tablet  Commonly known as: ZyPREXA  Medication Adjustments for Surgery: Do Not take on the morning of surgery     pantoprazole 40 mg EC tablet  Commonly known as: ProtoNix  Take 1 tablet (40 mg) by mouth once daily in the morning. Take before meals. Do not crush, chew, or split.  Medication Adjustments for Surgery: Do Not take on the morning of surgery     progesterone 100 mg capsule  Commonly known as: Prometrium  Medication Adjustments for Surgery: Do Not take on the morning of  surgery     sodium,potassium,mag sulfates 17.5-3.13-1.6 gram solution  Commonly known as: Suprep Bowel Prep Kit  Take 2 bottles by mouth if needed (Kit prep take one bottle at 6pm night before procedure and take second bottle at 4 am day of procedure).  Medication Adjustments for Surgery: Take/Use as prescribed     Spravato 84 mg (28 mg x 3) spray,non-aerosol nasal spray  Generic drug: esketamine  Medication Adjustments for Surgery: Do Not take on the morning of surgery           * This list has 2 medication(s) that are the same as other medications prescribed for you. Read the directions carefully, and ask your doctor or other care provider to review them with you.                         NPO Instructions:     Follow PREP instructions. Call us if your prep is changed for new instructions.     Diet Instructions    5 days before your procedure:    · No nuts or seeds such as sesame and poppy seeds  · No beans, raw (fresh) fruits, vegetables with seeds, corn, popcorn  · No whole grains such as oatmeal, multigrain, or quinoa.      1 day before your exam, stop all solid foods. You can only have clear liquids that you can see through:    · Water, clear juice, broth  · Gelatin, jello, popsicles, sport drinks (avoid red or purple color drinks)  · Black tea or coffee with no cream    Follow the colonoscopy prep instructions to clean out your bowel.  On the day of exam, nothing by mouth for at least 3 hours before your procedure time.      Exam Day - Bring the following:    · Photo ID and health insurance card  · List of all medications you take and any allergies  · Advance directive and/or durable health care power  document if you have them.  · CPAP, BIPAP machine, portable oxygen tank if you use them at home.      If you are a female of childbearing age, you may be asked to perform a urine pregnancy test or sign a pregnancy waiver.    For your safety, you will not be allowed to drive home yourself, use a taxi, bus,  or ridDineroMail service such as Uber. You must have a designated  to drive you home.    Do not hesitate to contact the [facility] endoscopy center that your procedure is scheduled at or the doctor office performing your procedure for any questions. List of contact information for the endoscopy centers can be found at the end of this document.    PLEASE refrain from gum, candy, mints, and smoking in the morning.    STOP DRINKING 3 HOURS PRIOR TO YOUR PROCEDURE.     Additional Instructions:      Review your medication instructions, take indicated medications and STOP those when applicable.  Wear comfortable, loose fitting clothing.  Please remove ALL jewelry and body piercing's.   All valuables should be left at home.  Bring a glass case or container/solution for contacts.  Bring Photo ID and Insurance card.     Park in back of hospital by ER. Come up to Second floor-OUTPATIENT dept to check-in.    You MUST have an adult  with you. NO DRIVING FOR 24 HOURS AFTER SURGERY.     If you get ill at all the week before your procedure- CALL YOUR DOCTOR/SURGEON.  Any illness might lead to your procedure being delayed.    Please call us if you are started on any new medications before your procedure.      Call Outpatient dept at [phone] between 1-3pm the day before your procedure (Friday for Monday procedure), for your arrival time.

## 2025-07-01 NOTE — PREPROCEDURE INSTRUCTIONS
Medication List            Accurate as of July 1, 2025  3:01 PM. Always use your most recent med list.                aspirin 81 mg EC tablet  Medication Adjustments for Surgery: Do Not take on the morning of surgery     b complex 0.4 mg tablet  Additional Medication Adjustments for Surgery: Take last dose 7 days before surgery     * Caplyta 42 mg capsule  Generic drug: lumateperone  Medication Adjustments for Surgery: Do Not take on the morning of surgery     * Caplyta 42 mg capsule  Generic drug: lumateperone  Medication Adjustments for Surgery: Do Not take on the morning of surgery     cholestyramine light 4 gram packet  Commonly known as: Prevalite  Take 1 packet (4 g) by mouth 2 times a day.  Medication Adjustments for Surgery: Do Not take on the morning of surgery     desvenlafaxine 100 mg 24 hr tablet  Commonly known as: Pristiq  Medication Adjustments for Surgery: Do Not take on the morning of surgery     diazePAM 5 mg tablet  Commonly known as: Valium  Medication Adjustments for Surgery: Do Not take on the morning of surgery     estradiol 0.1 mg/24 hr patch  Commonly known as: Vivelle-DOT  Medication Adjustments for Surgery: Do Not take on the morning of surgery     lisdexamfetamine 50 mg capsule  Commonly known as: Vyvanse  Medication Adjustments for Surgery: Do Not take on the morning of surgery     lithium aspartate 20 mg capsule  Medication Adjustments for Surgery: Do Not take on the morning of surgery     OLANZapine 2.5 mg tablet  Commonly known as: ZyPREXA  Medication Adjustments for Surgery: Do Not take on the morning of surgery     pantoprazole 40 mg EC tablet  Commonly known as: ProtoNix  Take 1 tablet (40 mg) by mouth once daily in the morning. Take before meals. Do not crush, chew, or split.  Medication Adjustments for Surgery: Do Not take on the morning of surgery     progesterone 100 mg capsule  Commonly known as: Prometrium  Medication Adjustments for Surgery: Do Not take on the morning of  surgery     sodium,potassium,mag sulfates 17.5-3.13-1.6 gram solution  Commonly known as: Suprep Bowel Prep Kit  Take 2 bottles by mouth if needed (Kit prep take one bottle at 6pm night before procedure and take second bottle at 4 am day of procedure).  Medication Adjustments for Surgery: Take/Use as prescribed     Spravato 84 mg (28 mg x 3) spray,non-aerosol nasal spray  Generic drug: esketamine  Medication Adjustments for Surgery: Do Not take on the morning of surgery           * This list has 2 medication(s) that are the same as other medications prescribed for you. Read the directions carefully, and ask your doctor or other care provider to review them with you.                            NPO Instructions:     Follow PREP instructions. Call us if your prep is changed for new instructions.     Diet Instructions    5 days before your procedure:    · No nuts or seeds such as sesame and poppy seeds  · No beans, raw (fresh) fruits, vegetables with seeds, corn, popcorn  · No whole grains such as oatmeal, multigrain, or quinoa.      1 day before your exam, stop all solid foods. You can only have clear liquids that you can see through:    · Water, clear juice, broth  · Gelatin, jello, popsicles, sport drinks (avoid red or purple color drinks)  · Black tea or coffee with no cream    Follow the colonoscopy prep instructions to clean out your bowel.  On the day of exam, nothing by mouth for at least 3 hours before your procedure time.      Exam Day - Bring the following:    · Photo ID and health insurance card  · List of all medications you take and any allergies  · Advance directive and/or durable health care power  document if you have them.  · CPAP, BIPAP machine, portable oxygen tank if you use them at home.      If you are a female of childbearing age, you may be asked to perform a urine pregnancy test or sign a pregnancy waiver.    For your safety, you will not be allowed to drive home yourself, use a taxi,  bus, or rideshAuctionPay service such as Uber. You must have a designated  to drive you home.    Do not hesitate to contact the [facility] endoscopy center that your procedure is scheduled at or the doctor office performing your procedure for any questions. List of contact information for the endoscopy centers can be found at the end of this document.    PLEASE refrain from gum, candy, mints, and smoking in the morning.    STOP DRINKING 3 HOURS PRIOR TO YOUR PROCEDURE.     Additional Instructions:      Review your medication instructions, take indicated medications and STOP those when applicable.  Wear comfortable, loose fitting clothing.  Please remove ALL jewelry and body piercing's.   All valuables should be left at home.  Bring a glass case or container/solution for contacts.  Bring Photo ID and Insurance card.     Park in back of hospital by ER. Come up to Second floor-OUTPATIENT dept to check-in.    You MUST have an adult  with you. NO DRIVING FOR 24 HOURS AFTER SURGERY.     If you get ill at all the week before your procedure- CALL YOUR DOCTOR/SURGEON.  Any illness might lead to your procedure being delayed.    Please call us if you are started on any new medications before your procedure.      Call Outpatient dept at [phone] between 1-3pm the day before your procedure (Friday for Monday procedure), for your arrival time.

## 2025-07-07 ENCOUNTER — ANESTHESIA (OUTPATIENT)
Dept: GASTROENTEROLOGY | Facility: HOSPITAL | Age: 45
End: 2025-07-07
Payer: MEDICARE

## 2025-07-07 ENCOUNTER — HOSPITAL ENCOUNTER (OUTPATIENT)
Dept: GASTROENTEROLOGY | Facility: HOSPITAL | Age: 45
Discharge: HOME | End: 2025-07-07
Payer: MEDICARE

## 2025-07-07 ENCOUNTER — APPOINTMENT (OUTPATIENT)
Dept: GASTROENTEROLOGY | Facility: HOSPITAL | Age: 45
End: 2025-07-07
Payer: COMMERCIAL

## 2025-07-07 VITALS
SYSTOLIC BLOOD PRESSURE: 97 MMHG | BODY MASS INDEX: 31.4 KG/M2 | RESPIRATION RATE: 16 BRPM | DIASTOLIC BLOOD PRESSURE: 76 MMHG | OXYGEN SATURATION: 97 % | TEMPERATURE: 97.7 F | HEIGHT: 69 IN | HEART RATE: 66 BPM | WEIGHT: 212 LBS

## 2025-07-07 DIAGNOSIS — R19.7 DIARRHEA, UNSPECIFIED TYPE: ICD-10-CM

## 2025-07-07 DIAGNOSIS — R10.13 EPIGASTRIC PAIN: ICD-10-CM

## 2025-07-07 PROCEDURE — 7100000010 HC PHASE TWO TIME - EACH INCREMENTAL 1 MINUTE

## 2025-07-07 PROCEDURE — 43239 EGD BIOPSY SINGLE/MULTIPLE: CPT | Performed by: SURGERY

## 2025-07-07 PROCEDURE — 3700000001 HC GENERAL ANESTHESIA TIME - INITIAL BASE CHARGE

## 2025-07-07 PROCEDURE — 3700000002 HC GENERAL ANESTHESIA TIME - EACH INCREMENTAL 1 MINUTE

## 2025-07-07 PROCEDURE — 2500000004 HC RX 250 GENERAL PHARMACY W/ HCPCS (ALT 636 FOR OP/ED): Performed by: NURSE ANESTHETIST, CERTIFIED REGISTERED

## 2025-07-07 PROCEDURE — 45380 COLONOSCOPY AND BIOPSY: CPT | Performed by: SURGERY

## 2025-07-07 PROCEDURE — 7100000009 HC PHASE TWO TIME - INITIAL BASE CHARGE

## 2025-07-07 RX ORDER — DIAZEPAM 10 MG/1
1 TABLET ORAL
COMMUNITY
Start: 2025-06-16

## 2025-07-07 RX ORDER — MIDAZOLAM HYDROCHLORIDE 1 MG/ML
INJECTION INTRAMUSCULAR; INTRAVENOUS AS NEEDED
Status: DISCONTINUED | OUTPATIENT
Start: 2025-07-07 | End: 2025-07-07

## 2025-07-07 RX ORDER — DIVALPROEX SODIUM 250 MG/1
1 TABLET, DELAYED RELEASE ORAL
COMMUNITY
Start: 2025-06-16

## 2025-07-07 RX ORDER — SODIUM CHLORIDE, SODIUM LACTATE, POTASSIUM CHLORIDE, CALCIUM CHLORIDE 600; 310; 30; 20 MG/100ML; MG/100ML; MG/100ML; MG/100ML
50 INJECTION, SOLUTION INTRAVENOUS CONTINUOUS
Status: DISCONTINUED | OUTPATIENT
Start: 2025-07-07 | End: 2025-07-08 | Stop reason: HOSPADM

## 2025-07-07 RX ORDER — PROPOFOL 10 MG/ML
INJECTION, EMULSION INTRAVENOUS AS NEEDED
Status: DISCONTINUED | OUTPATIENT
Start: 2025-07-07 | End: 2025-07-07

## 2025-07-07 RX ADMIN — PROPOFOL 50 MG: 10 INJECTION, EMULSION INTRAVENOUS at 10:21

## 2025-07-07 RX ADMIN — PROPOFOL 50 MG: 10 INJECTION, EMULSION INTRAVENOUS at 10:01

## 2025-07-07 RX ADMIN — PROPOFOL 50 MG: 10 INJECTION, EMULSION INTRAVENOUS at 10:14

## 2025-07-07 RX ADMIN — PROPOFOL 50 MG: 10 INJECTION, EMULSION INTRAVENOUS at 10:03

## 2025-07-07 RX ADMIN — MIDAZOLAM HYDROCHLORIDE 2 MG: 1 INJECTION, SOLUTION INTRAMUSCULAR; INTRAVENOUS at 09:56

## 2025-07-07 RX ADMIN — PROPOFOL 50 MG: 10 INJECTION, EMULSION INTRAVENOUS at 10:04

## 2025-07-07 RX ADMIN — PROPOFOL 50 MG: 10 INJECTION, EMULSION INTRAVENOUS at 10:07

## 2025-07-07 RX ADMIN — PROPOFOL 50 MG: 10 INJECTION, EMULSION INTRAVENOUS at 10:17

## 2025-07-07 RX ADMIN — PROPOFOL 50 MG: 10 INJECTION, EMULSION INTRAVENOUS at 10:02

## 2025-07-07 RX ADMIN — PROPOFOL 50 MG: 10 INJECTION, EMULSION INTRAVENOUS at 10:09

## 2025-07-07 SDOH — HEALTH STABILITY: MENTAL HEALTH: CURRENT SMOKER: 0

## 2025-07-07 ASSESSMENT — PAIN - FUNCTIONAL ASSESSMENT
PAIN_FUNCTIONAL_ASSESSMENT: 0-10

## 2025-07-07 ASSESSMENT — PAIN SCALES - GENERAL
PAINLEVEL_OUTOF10: 0 - NO PAIN
PAIN_LEVEL: 0
PAINLEVEL_OUTOF10: 0 - NO PAIN

## 2025-07-07 NOTE — H&P
"History Of Present Illness  Chery Matias is a 45 y.o. female presenting for an EGD/ Colonoscopy      Past Medical History  Medical History[1]    Surgical History  Surgical History[2]     Social History  She reports that she quit smoking about 17 months ago. Her smoking use included cigarettes. She started smoking about 26 years ago. She has a 40 pack-year smoking history. She has never used smokeless tobacco. She reports that she does not currently use alcohol. She reports that she does not use drugs.    Family History  Family History[3]     Allergies  Patient has no known allergies.    Review of Systems   All other systems reviewed and are negative.       Physical Exam  Constitutional:       Appearance: Normal appearance.   Cardiovascular:      Heart sounds: Normal heart sounds.   Pulmonary:      Breath sounds: Normal breath sounds and air entry.   Abdominal:      General: Abdomen is flat.      Palpations: Abdomen is soft.      Tenderness: There is no abdominal tenderness.   Neurological:      Mental Status: She is alert.          Last Recorded Vitals  Blood pressure 106/75, pulse 76, temperature 36.5 °C (97.7 °F), temperature source Temporal, resp. rate 17, height 1.753 m (5' 9\"), weight 96.2 kg (212 lb), last menstrual period 04/15/2024, SpO2 100%.       Assessment & Plan  Epigastric pain    Diarrhea, unspecified type      ESOPHAGOGASTRODUODENOSCOPY/ COLONOSCOPY, BIOPSIES.  Risks include, but not limited to pain, infection, bleeding,perforation, missed lesions, incomplete colonoscopy,aspiration, risk of cardiac, pulmonary, neurologic, locomotor, anesthetic events, other unforeseen complications including death.      Brendon López MD         [1]   Past Medical History:  Diagnosis Date    ADHD     Anxiety 2000    Chronic diarrhea 2022    Delayed emergence from general anesthesia 1986    Depression 2004    Depression     Encounter for screening for malignant neoplasm of cervix 12/05/2013    Screening for malignant " neoplasm of cervix    GERD (gastroesophageal reflux disease)     Hernia, internal     Irritable bowel syndrome     Maternal care for other abnormalities of cervix, unspecified trimester ([facility]) 2016    H/O LEEP (loop electrosurgical excision procedure) of cervix complicating pregnancy    Motion sickness     Multiple births, unspecified, all liveborn ([facility])     Multiple births, all liveborn    Other conditions influencing health status     Menstruation    Personal history of diseases of the blood and blood-forming organs and certain disorders involving the immune mechanism     History of antiphospholipid syndrome    Personal history of other complications of pregnancy, childbirth and the puerperium     History of spontaneous     Personal history of other diseases of the circulatory system     History of high blood pressure    Personal history of other diseases of the female genital tract     Vaginal delivery    Personal history of other infectious and parasitic diseases 2019    History of varicella    Personal history of other specified conditions     History of abnormal Pap smear    PONV (postoperative nausea and vomiting)     PTSD (post-traumatic stress disorder)     Severe pre-eclampsia, unspecified trimester ([facility])     Severely increased blood pressure and swelling during pregnancy    Spinal headache    [2]   Past Surgical History:  Procedure Laterality Date    CERVICAL BIOPSY  W/ LOOP ELECTRODE EXCISION  2014    Cervical Loop Electrosurgical Excision (LEEP)    DILATION AND CURETTAGE OF UTERUS  2014    Dilation And Curettage    HYSTERECTOMY      TONSILLECTOMY  2018    Tonsillectomy    UMBILICAL HERNIA REPAIR     [3]   Family History  Problem Relation Name Age of Onset    Adjustment Disorder with Mixed Anxiety and Depressed Mood Mother      Other (cardiac disorder) Mother      Depression Mother      Hypertension Mother      Stroke Mother       Hyperlipidemia Mother      Arthritis Maternal Grandmother      Other (cardiac disorder) Maternal Grandfather      Depression Maternal Grandfather      Adjustment Disorder with Mixed Anxiety and Depressed Mood Other grandfather     Other (cardiac disorder) Other grandfather     Hypertension Other grandfather     Stroke Other grandfather     Hyperlipidemia Other grandfather     Mental illness Mother Melvina Ireneeker     Stroke Maternal Grandfather Mitchell Rivas     Heart attack Maternal Grandfather Mitchell Rivas 40 - 49

## 2025-07-07 NOTE — ANESTHESIA POSTPROCEDURE EVALUATION
Patient: Chery Matias    Procedure Summary       Date: 07/07/25 Room / Location: [facility]    Anesthesia Start: 0956 Anesthesia Stop:     Procedures:       EGD      COLONOSCOPY Diagnosis:       Epigastric pain      Diarrhea, unspecified type    Scheduled Providers: Brendon López MD Responsible Provider: JORGE Fontaine    Anesthesia Type: MAC ASA Status: 3            Anesthesia Type: MAC    Vitals Value Taken Time   /65 07/07/25 10:29   Temp 36.7 07/07/25 10:29   Pulse 72 07/07/25 10:29   Resp 14 07/07/25 10:29   SpO2 100 07/07/25 10:29       Anesthesia Post Evaluation    Patient location during evaluation: PACU  Patient participation: waiting for patient participation  Level of consciousness: responsive to light touch  Pain score: 0  Pain management: adequate  Airway patency: patent  Cardiovascular status: acceptable  Respiratory status: acceptable  Hydration status: acceptable  Postoperative Nausea and Vomiting: none        No notable events documented.

## 2025-07-07 NOTE — DISCHARGE INSTRUCTIONS

## 2025-07-16 ENCOUNTER — RESULTS FOLLOW-UP (OUTPATIENT)
Dept: SURGERY | Facility: HOSPITAL | Age: 45
End: 2025-07-16
Payer: COMMERCIAL

## 2025-07-16 DIAGNOSIS — R19.7 DIARRHEA, UNSPECIFIED TYPE: Primary | ICD-10-CM

## 2025-07-16 DIAGNOSIS — R10.84 GENERALIZED ABDOMINAL WALL PAIN: ICD-10-CM

## 2025-07-16 LAB
LABORATORY COMMENT REPORT: NORMAL
PATH REPORT.FINAL DX SPEC: NORMAL
PATH REPORT.GROSS SPEC: NORMAL
PATH REPORT.RELEVANT HX SPEC: NORMAL
PATH REPORT.TOTAL CANCER: NORMAL

## 2025-07-16 NOTE — TELEPHONE ENCOUNTER
----- Message from Brendon López sent at 7/16/2025  3:01 PM EDT -----  Let her know that her colon biopsy showed no evidence of an abnormality in the colon.  There is a questionable possibility of celiac disease.  I have sent her a celiac panel that I like her to   obtain.  Will contact her with that result.  She does not need a colonoscopy for 10 years and will receive a reminder for this..  Find out if her diarrhea is improving  ----- Message -----  From: Lab, Background User  Sent: 7/16/2025  12:54 PM EDT  To: Brendon López MD

## 2025-07-22 ENCOUNTER — RESULTS FOLLOW-UP (OUTPATIENT)
Dept: SURGERY | Facility: HOSPITAL | Age: 45
End: 2025-07-22
Payer: COMMERCIAL

## 2025-07-22 LAB
ALBUMIN SERPL-MCNC: 4.2 G/DL (ref 3.6–5.1)
ALP SERPL-CCNC: 66 U/L (ref 31–125)
ALT SERPL-CCNC: 10 U/L (ref 6–29)
ANION GAP SERPL CALCULATED.4IONS-SCNC: 7 MMOL/L (CALC) (ref 7–17)
AST SERPL-CCNC: 13 U/L (ref 10–30)
BILIRUB SERPL-MCNC: 0.6 MG/DL (ref 0.2–1.2)
BUN SERPL-MCNC: 13 MG/DL (ref 7–25)
CALCIUM SERPL-MCNC: 8.9 MG/DL (ref 8.6–10.2)
CHLORIDE SERPL-SCNC: 105 MMOL/L (ref 98–110)
CO2 SERPL-SCNC: 27 MMOL/L (ref 20–32)
CREAT SERPL-MCNC: 1.12 MG/DL (ref 0.5–0.99)
EGFRCR SERPLBLD CKD-EPI 2021: 62 ML/MIN/1.73M2
GLIADIN IGA SER IA-ACNC: 1.5 U/ML
GLIADIN IGG SER IA-ACNC: <1 U/ML
GLUCOSE SERPL-MCNC: 84 MG/DL (ref 65–99)
IGA SERPL-MCNC: 68 MG/DL (ref 47–310)
POTASSIUM SERPL-SCNC: 4.3 MMOL/L (ref 3.5–5.3)
PROT SERPL-MCNC: 6.2 G/DL (ref 6.1–8.1)
SODIUM SERPL-SCNC: 139 MMOL/L (ref 135–146)
TTG IGA SER-ACNC: <1 U/ML
TTG IGG SER-ACNC: <1 U/ML

## 2025-07-22 NOTE — TELEPHONE ENCOUNTER
----- Message from Brendon López sent at 7/22/2025  3:18 PM EDT -----  Let her know the celiac panel is normal   ----- Message -----  From: [facility] Results In  Sent: 7/22/2025  12:33 PM EDT  To: Brendon López MD

## 2025-07-24 NOTE — TELEPHONE ENCOUNTER
----- Message from Brendon López sent at 7/22/2025  3:18 PM EDT -----  Let her know the celiac panel is normal   ----- Message -----  From: [facility] [employer] Results In  Sent: 7/22/2025  12:33 PM EDT  To: Brendon López MD

## 2025-08-21 ENCOUNTER — TELEPHONE (OUTPATIENT)
Facility: CLINIC | Age: 45
End: 2025-08-21
Payer: COMMERCIAL

## 2025-08-25 DIAGNOSIS — R10.13 EPIGASTRIC PAIN: ICD-10-CM

## 2025-08-25 RX ORDER — PANTOPRAZOLE SODIUM 40 MG/1
TABLET, DELAYED RELEASE ORAL
Qty: 90 TABLET | Refills: 0 | Status: SHIPPED | OUTPATIENT
Start: 2025-08-25